# Patient Record
Sex: FEMALE | Race: BLACK OR AFRICAN AMERICAN | NOT HISPANIC OR LATINO | Employment: OTHER | URBAN - METROPOLITAN AREA
[De-identification: names, ages, dates, MRNs, and addresses within clinical notes are randomized per-mention and may not be internally consistent; named-entity substitution may affect disease eponyms.]

---

## 2021-02-15 ENCOUNTER — TRANSCRIBE ORDERS (OUTPATIENT)
Dept: ADMINISTRATIVE | Facility: HOSPITAL | Age: 41
End: 2021-02-15

## 2021-02-15 ENCOUNTER — LAB (OUTPATIENT)
Dept: LAB | Facility: HOSPITAL | Age: 41
End: 2021-02-15
Attending: FAMILY MEDICINE
Payer: MEDICARE

## 2021-02-15 DIAGNOSIS — K59.00 CONSTIPATION, UNSPECIFIED CONSTIPATION TYPE: ICD-10-CM

## 2021-02-15 DIAGNOSIS — R53.83 FATIGUE, UNSPECIFIED TYPE: ICD-10-CM

## 2021-02-15 DIAGNOSIS — I10 ESSENTIAL HYPERTENSION, MALIGNANT: ICD-10-CM

## 2021-02-15 DIAGNOSIS — I10 ESSENTIAL HYPERTENSION, MALIGNANT: Primary | ICD-10-CM

## 2021-02-15 LAB
25(OH)D3 SERPL-MCNC: 8.1 NG/ML (ref 30–100)
ALBUMIN SERPL BCP-MCNC: 4.1 G/DL (ref 3.4–4.8)
ALP SERPL-CCNC: 68.8 U/L (ref 35–140)
ALT SERPL W P-5'-P-CCNC: 23 U/L (ref 5–54)
ANION GAP SERPL CALCULATED.3IONS-SCNC: 7 MMOL/L (ref 4–13)
AST SERPL W P-5'-P-CCNC: 18 U/L (ref 15–41)
BASOPHILS # BLD AUTO: 0.03 THOUSANDS/ΜL (ref 0–0.1)
BASOPHILS NFR BLD AUTO: 1 % (ref 0–1)
BILIRUB SERPL-MCNC: 0.28 MG/DL (ref 0.3–1.2)
BUN SERPL-MCNC: 10 MG/DL (ref 6–20)
CALCIUM SERPL-MCNC: 9 MG/DL (ref 8.4–10.2)
CHLORIDE SERPL-SCNC: 104 MMOL/L (ref 96–108)
CHOLEST SERPL-MCNC: 162 MG/DL
CO2 SERPL-SCNC: 25 MMOL/L (ref 22–33)
CREAT SERPL-MCNC: 0.69 MG/DL (ref 0.4–1.1)
EOSINOPHIL # BLD AUTO: 0.1 THOUSAND/ΜL (ref 0–0.61)
EOSINOPHIL NFR BLD AUTO: 2 % (ref 0–6)
ERYTHROCYTE [DISTWIDTH] IN BLOOD BY AUTOMATED COUNT: 18.4 % (ref 11.6–15.1)
GFR SERPL CREATININE-BSD FRML MDRD: 127 ML/MIN/1.73SQ M
GLUCOSE P FAST SERPL-MCNC: 93 MG/DL (ref 70–105)
HCT VFR BLD AUTO: 32.7 % (ref 34.8–46.1)
HDLC SERPL-MCNC: 87 MG/DL
HGB BLD-MCNC: 10.1 G/DL (ref 11.5–15.4)
IMM GRANULOCYTES # BLD AUTO: 0.02 THOUSAND/UL (ref 0–0.2)
IMM GRANULOCYTES NFR BLD AUTO: 0 % (ref 0–2)
LDLC SERPL CALC-MCNC: 66 MG/DL (ref 0–100)
LYMPHOCYTES # BLD AUTO: 2.05 THOUSANDS/ΜL (ref 0.6–4.47)
LYMPHOCYTES NFR BLD AUTO: 36 % (ref 14–44)
MCH RBC QN AUTO: 22.5 PG (ref 26.8–34.3)
MCHC RBC AUTO-ENTMCNC: 30.9 G/DL (ref 31.4–37.4)
MCV RBC AUTO: 73 FL (ref 82–98)
MONOCYTES # BLD AUTO: 0.98 THOUSAND/ΜL (ref 0.17–1.22)
MONOCYTES NFR BLD AUTO: 17 % (ref 4–12)
NEUTROPHILS # BLD AUTO: 2.47 THOUSANDS/ΜL (ref 1.85–7.62)
NEUTS SEG NFR BLD AUTO: 44 % (ref 43–75)
NONHDLC SERPL-MCNC: 75 MG/DL
PLATELET # BLD AUTO: 379 THOUSANDS/UL (ref 149–390)
PMV BLD AUTO: 9.8 FL (ref 8.9–12.7)
POTASSIUM SERPL-SCNC: 4 MMOL/L (ref 3.5–5)
PROT SERPL-MCNC: 7.8 G/DL (ref 6.4–8.3)
RBC # BLD AUTO: 4.48 MILLION/UL (ref 3.81–5.12)
SODIUM SERPL-SCNC: 136 MMOL/L (ref 133–145)
TRIGL SERPL-MCNC: 46.6 MG/DL
TSH SERPL DL<=0.05 MIU/L-ACNC: 2.5 UIU/ML (ref 0.34–5.6)
WBC # BLD AUTO: 5.65 THOUSAND/UL (ref 4.31–10.16)

## 2021-02-15 PROCEDURE — 36415 COLL VENOUS BLD VENIPUNCTURE: CPT

## 2021-02-15 PROCEDURE — 84443 ASSAY THYROID STIM HORMONE: CPT

## 2021-02-15 PROCEDURE — 85025 COMPLETE CBC W/AUTO DIFF WBC: CPT

## 2021-02-15 PROCEDURE — 80061 LIPID PANEL: CPT

## 2021-02-15 PROCEDURE — 86618 LYME DISEASE ANTIBODY: CPT

## 2021-02-15 PROCEDURE — 80053 COMPREHEN METABOLIC PANEL: CPT

## 2021-02-15 PROCEDURE — 82306 VITAMIN D 25 HYDROXY: CPT

## 2021-02-16 LAB — B BURGDOR IGG+IGM SER-ACNC: 39

## 2021-04-23 ENCOUNTER — APPOINTMENT (EMERGENCY)
Dept: CT IMAGING | Facility: HOSPITAL | Age: 41
End: 2021-04-23
Payer: MEDICARE

## 2021-04-23 ENCOUNTER — HOSPITAL ENCOUNTER (EMERGENCY)
Facility: HOSPITAL | Age: 41
Discharge: HOME | End: 2021-04-23
Attending: EMERGENCY MEDICINE | Admitting: EMERGENCY MEDICINE
Payer: MEDICARE

## 2021-04-23 VITALS
DIASTOLIC BLOOD PRESSURE: 75 MMHG | SYSTOLIC BLOOD PRESSURE: 143 MMHG | HEART RATE: 68 BPM | TEMPERATURE: 98.5 F | BODY MASS INDEX: 44.61 KG/M2 | RESPIRATION RATE: 16 BRPM | OXYGEN SATURATION: 99 % | WEIGHT: 240 LBS

## 2021-04-23 DIAGNOSIS — R59.9 ENLARGEMENT OF LYMPH NODE: Primary | ICD-10-CM

## 2021-04-23 LAB
ANION GAP SERPL CALCULATED.3IONS-SCNC: 6 MMOL/L (ref 4–13)
BASOPHILS # BLD AUTO: 0.03 THOUSANDS/ΜL (ref 0–0.1)
BASOPHILS NFR BLD AUTO: 1 % (ref 0–1)
BUN SERPL-MCNC: 13 MG/DL (ref 6–20)
CALCIUM SERPL-MCNC: 9.5 MG/DL (ref 8.4–10.2)
CHLORIDE SERPL-SCNC: 104 MMOL/L (ref 96–108)
CO2 SERPL-SCNC: 27 MMOL/L (ref 22–33)
CREAT SERPL-MCNC: 0.68 MG/DL (ref 0.4–1.1)
EOSINOPHIL # BLD AUTO: 0.09 THOUSAND/ΜL (ref 0–0.61)
EOSINOPHIL NFR BLD AUTO: 2 % (ref 0–6)
ERYTHROCYTE [DISTWIDTH] IN BLOOD BY AUTOMATED COUNT: 19.5 % (ref 11.6–15.1)
EXT PREG TEST URINE: NEGATIVE
EXT. CONTROL ED NAV: NORMAL
GFR SERPL CREATININE-BSD FRML MDRD: 127 ML/MIN/1.73SQ M
GLUCOSE SERPL-MCNC: 87 MG/DL (ref 65–140)
HCT VFR BLD AUTO: 36.7 % (ref 34.8–46.1)
HGB BLD-MCNC: 11.2 G/DL (ref 11.5–15.4)
IMM GRANULOCYTES # BLD AUTO: 0.01 THOUSAND/UL (ref 0–0.2)
IMM GRANULOCYTES NFR BLD AUTO: 0 % (ref 0–2)
LYMPHOCYTES # BLD AUTO: 1.81 THOUSANDS/ΜL (ref 0.6–4.47)
LYMPHOCYTES NFR BLD AUTO: 29 % (ref 14–44)
MCH RBC QN AUTO: 22.4 PG (ref 26.8–34.3)
MCHC RBC AUTO-ENTMCNC: 30.5 G/DL (ref 31.4–37.4)
MCV RBC AUTO: 73 FL (ref 82–98)
MONOCYTES # BLD AUTO: 1.06 THOUSAND/ΜL (ref 0.17–1.22)
MONOCYTES NFR BLD AUTO: 17 % (ref 4–12)
NEUTROPHILS # BLD AUTO: 3.15 THOUSANDS/ΜL (ref 1.85–7.62)
NEUTS SEG NFR BLD AUTO: 51 % (ref 43–75)
PLATELET # BLD AUTO: 356 THOUSANDS/UL (ref 149–390)
PMV BLD AUTO: 9.7 FL (ref 8.9–12.7)
POTASSIUM SERPL-SCNC: 4.1 MMOL/L (ref 3.5–5)
RBC # BLD AUTO: 5.01 MILLION/UL (ref 3.81–5.12)
SODIUM SERPL-SCNC: 137 MMOL/L (ref 133–145)
TSH SERPL DL<=0.05 MIU/L-ACNC: 1.21 UIU/ML (ref 0.34–5.6)
WBC # BLD AUTO: 6.15 THOUSAND/UL (ref 4.31–10.16)

## 2021-04-23 PROCEDURE — 99284 EMERGENCY DEPT VISIT MOD MDM: CPT

## 2021-04-23 PROCEDURE — 99284 EMERGENCY DEPT VISIT MOD MDM: CPT | Performed by: EMERGENCY MEDICINE

## 2021-04-23 PROCEDURE — 85025 COMPLETE CBC W/AUTO DIFF WBC: CPT | Performed by: EMERGENCY MEDICINE

## 2021-04-23 PROCEDURE — 80048 BASIC METABOLIC PNL TOTAL CA: CPT | Performed by: EMERGENCY MEDICINE

## 2021-04-23 PROCEDURE — 36415 COLL VENOUS BLD VENIPUNCTURE: CPT | Performed by: EMERGENCY MEDICINE

## 2021-04-23 PROCEDURE — 84443 ASSAY THYROID STIM HORMONE: CPT | Performed by: EMERGENCY MEDICINE

## 2021-04-23 PROCEDURE — 96360 HYDRATION IV INFUSION INIT: CPT

## 2021-04-23 PROCEDURE — G1004 CDSM NDSC: HCPCS

## 2021-04-23 PROCEDURE — 70491 CT SOFT TISSUE NECK W/DYE: CPT

## 2021-04-23 PROCEDURE — 81025 URINE PREGNANCY TEST: CPT | Performed by: EMERGENCY MEDICINE

## 2021-04-23 RX ORDER — LIDOCAINE HYDROCHLORIDE 20 MG/ML
15 SOLUTION OROPHARYNGEAL ONCE
Status: COMPLETED | OUTPATIENT
Start: 2021-04-23 | End: 2021-04-23

## 2021-04-23 RX ORDER — METOPROLOL TARTRATE 50 MG/1
50 TABLET, FILM COATED ORAL EVERY 12 HOURS SCHEDULED
COMMUNITY

## 2021-04-23 RX ADMIN — DEXAMETHASONE SODIUM PHOSPHATE 10 MG: 10 INJECTION, SOLUTION INTRAMUSCULAR; INTRAVENOUS at 11:13

## 2021-04-23 RX ADMIN — IOHEXOL 100 ML: 350 INJECTION, SOLUTION INTRAVENOUS at 12:06

## 2021-04-23 RX ADMIN — LIDOCAINE HYDROCHLORIDE 15 ML: 20 SOLUTION ORAL; TOPICAL at 11:13

## 2021-04-23 RX ADMIN — SODIUM CHLORIDE 1000 ML: 0.9 INJECTION, SOLUTION INTRAVENOUS at 11:13

## 2021-04-23 NOTE — ED PROVIDER NOTES
History  Chief Complaint   Patient presents with    Oral Swelling     Pt states she woke up this morning feeling like there was something swollen in the back of her throat, but denies pain  Pt states she feels like she was snooring too hard  HPI    46 yo F hx of htn presents to the ed for eval of throat discomfort  Patient states she woke up and thought she snored to hard  She states she feels like she has a lump in her throat  No allergies, no similar symptoms in past  She states she has sleep apnea, and was supposed to wear cpap, but didn't wear it last night  No rashes, no difficulty breathing  No difficulty swallowing, no drooling  States she woke up like this  No other complaints on ros  Prior to Admission Medications   Prescriptions Last Dose Informant Patient Reported? Taking? AMLODIPINE BESYLATE PO 2021 at Unknown time  Yes Yes   Sig: Take 10 mg by mouth daily   Ergocalciferol (VITAMIN D2 PO) Past Week at Unknown time  Yes Yes   Sig: Take 1 25 mg by mouth once a week    metoprolol tartrate (LOPRESSOR) 50 mg tablet 2021 at Unknown time  Yes Yes   Sig: Take 50 mg by mouth every 12 (twelve) hours      Facility-Administered Medications: None       Past Medical History:   Diagnosis Date    Hypertension        Past Surgical History:   Procedure Laterality Date     SECTION      GASTRIC BYPASS         History reviewed  No pertinent family history  I have reviewed and agree with the history as documented  E-Cigarette/Vaping    E-Cigarette Use Never User      E-Cigarette/Vaping Substances     Social History     Tobacco Use    Smoking status: Current Every Day Smoker     Types: Cigars    Smokeless tobacco: Never Used   Substance Use Topics    Alcohol use: Yes     Frequency: 4 or more times a week     Drinks per session: 3 or 4    Drug use: Never       Review of Systems   Constitutional: Negative for chills, fatigue and fever  HENT: Positive for facial swelling  Negative for congestion and sore throat  Eyes: Negative for redness and visual disturbance  Respiratory: Negative for cough and shortness of breath  Cardiovascular: Negative for chest pain  Gastrointestinal: Negative for abdominal pain, diarrhea and nausea  Genitourinary: Negative for difficulty urinating, dysuria and pelvic pain  Musculoskeletal: Negative for back pain  Skin: Negative for rash  Neurological: Negative for syncope, weakness and headaches  All other systems reviewed and are negative  Physical Exam  Physical Exam  Vitals signs and nursing note reviewed  Constitutional:       General: She is not in acute distress  HENT:      Head: Normocephalic and atraumatic  Mouth/Throat:      Comments: Submandibular lymphadenopathy  No tongue swelling  No airway compromise  No drooling or trismus   Uvula midline  Eyes:      Conjunctiva/sclera: Conjunctivae normal    Neck:      Musculoskeletal: Normal range of motion  Cardiovascular:      Rate and Rhythm: Normal rate and regular rhythm  Heart sounds: Normal heart sounds  Pulmonary:      Effort: Pulmonary effort is normal  No respiratory distress  Breath sounds: Normal breath sounds  Abdominal:      General: Bowel sounds are normal       Palpations: Abdomen is soft  Tenderness: There is no abdominal tenderness  Musculoskeletal: Normal range of motion  Skin:     General: Skin is warm and dry  Findings: No rash  Neurological:      Mental Status: She is alert and oriented to person, place, and time  Cranial Nerves: No cranial nerve deficit  Sensory: No sensory deficit  Motor: No abnormal muscle tone        Coordination: Coordination normal          Vital Signs  ED Triage Vitals [04/23/21 1036]   Temperature Pulse Respirations Blood Pressure SpO2   98 5 °F (36 9 °C) 96 16 168/85 98 %      Temp src Heart Rate Source Patient Position - Orthostatic VS BP Location FiO2 (%)   -- Monitor Sitting Left arm --      Pain Score       --           Vitals:    04/23/21 1036 04/23/21 1206 04/23/21 1316   BP: 168/85 134/78 143/75   Pulse: 96 77 68   Patient Position - Orthostatic VS: Sitting           Visual Acuity      ED Medications  Medications   dexamethasone oral liquid 10 mg 1 mL (10 mg Oral Given 4/23/21 1113)   Lidocaine Viscous HCl (XYLOCAINE) 2 % mucosal solution 15 mL (15 mL Swish & Swallow Given 4/23/21 1113)   sodium chloride 0 9 % bolus 1,000 mL (0 mL Intravenous Stopped 4/23/21 1213)   iohexol (OMNIPAQUE) 350 MG/ML injection (SINGLE-DOSE) 100 mL (100 mL Intravenous Given 4/23/21 1206)       Diagnostic Studies  Results Reviewed     Procedure Component Value Units Date/Time    TSH, 3rd generation with Free T4 reflex [[id number]]  (Normal) Collected: 04/23/21 1112    Lab Status: Final result Specimen: Blood from Arm, Left Updated: 04/23/21 1157     TSH 3RD GENERATON 1 211 uIU/mL     Narrative:      Patients undergoing fluorescein dye angiography may retain small amounts of fluorescein in the body for 48-72 hours post procedure  Samples containing fluorescein can produce falsely depressed TSH values  If the patient had this procedure,a specimen should be resubmitted post fluorescein clearance        Basic metabolic panel [[id number]] Collected: 04/23/21 1112    Lab Status: Final result Specimen: Blood from Arm, Left Updated: 04/23/21 1143     Sodium 137 mmol/L      Potassium 4 1 mmol/L      Chloride 104 mmol/L      CO2 27 mmol/L      ANION GAP 6 mmol/L      BUN 13 mg/dL      Creatinine 0 68 mg/dL      Glucose 87 mg/dL      Calcium 9 5 mg/dL      eGFR 127 ml/min/1 73sq m     Narrative:      [place] guidelines for Chronic Kidney Disease (CKD):     Stage 1 with normal or high GFR (GFR > 90 mL/min/1 73 square meters)    Stage 2 Mild CKD (GFR = 60-89 mL/min/1 73 square meters)    Stage 3A Moderate CKD (GFR = 45-59 mL/min/1 73 square meters)    Stage 3B Moderate CKD (GFR = 30-44 mL/min/1 73 square meters)    Stage 4 Severe CKD (GFR = 15-29 mL/min/1 73 square meters)    Stage 5 End Stage CKD (GFR <15 mL/min/1 73 square meters)  Note: GFR calculation is accurate only with a steady state creatinine    CBC and differential [[id number]]  (Abnormal) Collected: 04/23/21 1112    Lab Status: Final result Specimen: Blood from Arm, Left Updated: 04/23/21 1125     WBC 6 15 Thousand/uL      RBC 5 01 Million/uL      Hemoglobin 11 2 g/dL      Hematocrit 36 7 %      MCV 73 fL      MCH 22 4 pg      MCHC 30 5 g/dL      RDW 19 5 %      MPV 9 7 fL      Platelets 277 Thousands/uL      Neutrophils Relative 51 %      Immat GRANS % 0 %      Lymphocytes Relative 29 %      Monocytes Relative 17 %      Eosinophils Relative 2 %      Basophils Relative 1 %      Neutrophils Absolute 3 15 Thousands/µL      Immature Grans Absolute 0 01 Thousand/uL      Lymphocytes Absolute 1 81 Thousands/µL      Monocytes Absolute 1 06 Thousand/µL      Eosinophils Absolute 0 09 Thousand/µL      Basophils Absolute 0 03 Thousands/µL     POCT pregnancy, urine [[id number]]  (Normal) Resulted: 04/23/21 1111    Lab Status: Final result Updated: 04/23/21 1111     EXT PREG TEST UR (Ref: Negative) negative     Control valid                 CT soft tissue neck with contrast   Final Result by Chaz Skinner MD (04/23 1347)   Mildly enlarged submental lymph node located just to the left of the midline, measuring 1 x 1 2 x 1 4 cm   Borderline enlarged right level 2 lymph node measuring 1 6 x 2 4 x 1 2 cm  These are nonspecific may be reactive or due to lymphoproliferative     Clinical correlation suggested   These may be evaluated clinically or with ultrasound      Small level 3 and level 4 lymph nodes which do not meet the criteria for pathologic enlargement     A 1 6 cm nodule seen in the isthmus of the thyroid gland, can be evaluated with ultrasound   Patent cervical airway   No tonsillar or peritonsillar abscess      No retropharyngeal abscess or collection             I personally discussed this study with Bob Graham on 4/23/2021 at 1:47 PM                      Workstation performed: [id number]                    Procedures  Procedures         ED Course  ED Course as of Apr 23 1433   Fri Apr 23, 2021   1123 PREGNANCY TEST URINE: negative   1202 TSH 3RD GENERATON: 1 211   1346 WBC: 6 15         MDM    Lymphadenopathy, imaging of neck shows mild lymph node swelling, no abscess  Patient informed, needs to follow up with ENT, referral placed  Patient discharged with ENT and PCP follow up  The patient was instructed to follow up as documented  Strict return precautions were discussed with the patient and the patient was instructed to return to the emergency department immediately if symptoms worsen  The patient/patient family member acknowledged and were in agreement with plan  Disposition  Final diagnoses:   Enlargement of lymph node     Time reflects when diagnosis was documented in both MDM as applicable and the Disposition within this note     Time User Action Codes Description Comment    4/23/2021  1:47 PM Rosita Dent Add [R59 9] Enlargement of lymph node       ED Disposition     ED Disposition Condition Date/Time Comment    Discharge Stable Fri Apr 23, 2021  1:46 PM Leobardo Carney discharge to home/self care              Follow-up Information     Follow up With Specialties Details Why Contact Info    Maribeth Larsen MD Otolaryngology Schedule an appointment as soon as possible for a visit today For follow up regarding your symptoms and recheck [address] [facility]  [address] [facility] [address] [facility]      Christiano Hugo MD Family Medicine Schedule an appointment as soon as possible for a visit  For follow up regarding your symptoms and recheck, As needed [address] [facility]  [address] [facility] [address] [place] [address]  [phone]            Discharge Medication List as of 4/23/2021  1:48 PM      CONTINUE these medications which have NOT CHANGED    Details AMLODIPINE BESYLATE PO Take 10 mg by mouth daily, Historical Med      Ergocalciferol (VITAMIN D2 PO) Take 1 25 mg by mouth once a week Wednesdays, Historical Med      metoprolol tartrate (LOPRESSOR) 50 mg tablet Take 50 mg by mouth every 12 (twelve) hours, Historical Med               PDMP Review     None          ED Provider  Electronically Signed by           Taty Bell MD  04/23/21 7352

## 2021-04-29 ENCOUNTER — HOSPITAL ENCOUNTER (OUTPATIENT)
Dept: ULTRASOUND IMAGING | Facility: HOSPITAL | Age: 41
Discharge: HOME | End: 2021-04-29
Payer: MEDICARE

## 2021-04-29 DIAGNOSIS — E04.1 THYROID NODULE: ICD-10-CM

## 2021-04-29 PROCEDURE — 76536 US EXAM OF HEAD AND NECK: CPT

## 2021-05-03 NOTE — NURSING NOTE
Call placed to patient to discuss upcoming appointment at [place] [address] radiology department and complete consultation with patient and her   Patient is having an US guided thyroid biopsy, bilateral lymph node biopsies  Reviewed patient's allergies, no current anticoagulant medication present, also discussed the pre and post procedure expectations  Per her  she will be coming via [facility]  Reminded patient of location and time expected for procedure, Patient expressed understanding by verbalizing and repeating instructions

## 2021-05-10 NOTE — PROGRESS NOTES
Spoke with Dr Rebecca Walker ( ENT ) regarding script clarification for Ms Gonzalez upcoming thyroid and lymph node biopsy  On Ct of the soft tissues of the neck from 4/23/2021, there were several enlarged lymph nodes with the largest being the left submental 1 0 x 1 2 x 1 4 cm, a right level 2 lymph node measuring 1 6 x 2 4 x 1 2 cm, and a left level 2 lymph node measuring 1 2 cm  On thyroid ultrasound from 4/29/2021, there was no discrete thyroid nodules noted, however the right aspect of the isthmus thyroid was larger than the left  I reviewed the above findings with Dr Gladys Munoz and Dr Rebecca Walker  Per Dr Rebecca Walker we are to biopsy the three lymph nodes listed above      [facility]

## 2021-05-11 ENCOUNTER — TRANSCRIBE ORDERS (OUTPATIENT)
Dept: ADMINISTRATIVE | Facility: HOSPITAL | Age: 41
End: 2021-05-11

## 2021-05-11 ENCOUNTER — HOSPITAL ENCOUNTER (OUTPATIENT)
Dept: RADIOLOGY | Facility: HOSPITAL | Age: 41
Discharge: HOME | End: 2021-05-11
Attending: OTOLARYNGOLOGY
Payer: MEDICARE

## 2021-05-11 DIAGNOSIS — R59.0 ANTERIOR CERVICAL ADENOPATHY: ICD-10-CM

## 2021-05-11 DIAGNOSIS — E04.1 THYROID NODULE: ICD-10-CM

## 2021-05-11 PROCEDURE — 88184 FLOWCYTOMETRY/ TC 1 MARKER: CPT | Performed by: OTOLARYNGOLOGY

## 2021-05-11 PROCEDURE — 88185 FLOWCYTOMETRY/TC ADD-ON: CPT

## 2021-05-11 PROCEDURE — 88173 CYTOPATH EVAL FNA REPORT: CPT | Performed by: PATHOLOGY

## 2021-05-11 PROCEDURE — 10005 FNA BX W/US GDN 1ST LES: CPT

## 2021-05-11 PROCEDURE — 10006 FNA BX W/US GDN EA ADDL: CPT

## 2021-05-11 RX ORDER — LIDOCAINE HYDROCHLORIDE 10 MG/ML
2 INJECTION, SOLUTION EPIDURAL; INFILTRATION; INTRACAUDAL; PERINEURAL ONCE
Status: COMPLETED | OUTPATIENT
Start: 2021-05-11 | End: 2021-05-11

## 2021-05-11 RX ADMIN — LIDOCAINE HYDROCHLORIDE 2 ML: 10 INJECTION, SOLUTION EPIDURAL; INFILTRATION; INTRACAUDAL; PERINEURAL at 10:30

## 2021-05-12 LAB
SCAN RESULT: NORMAL

## 2021-05-19 PROBLEM — K21.9 LARYNGOPHARYNGEAL REFLUX (LPR): Status: ACTIVE | Noted: 2021-05-19

## 2023-03-08 ENCOUNTER — HOSPITAL ENCOUNTER (OUTPATIENT)
Dept: RADIOLOGY | Facility: HOSPITAL | Age: 43
Discharge: HOME | End: 2023-03-08
Attending: FAMILY MEDICINE

## 2023-03-08 ENCOUNTER — APPOINTMENT (OUTPATIENT)
Dept: LAB | Facility: HOSPITAL | Age: 43
End: 2023-03-08
Attending: FAMILY MEDICINE

## 2023-03-08 DIAGNOSIS — Z13.220 SCREENING FOR LIPOID DISORDERS: ICD-10-CM

## 2023-03-08 DIAGNOSIS — R06.00 DYSPNEA, UNSPECIFIED TYPE: ICD-10-CM

## 2023-03-08 DIAGNOSIS — I10 ESSENTIAL HYPERTENSION, BENIGN: ICD-10-CM

## 2023-03-08 DIAGNOSIS — E55.9 AVITAMINOSIS D: ICD-10-CM

## 2023-03-08 LAB
25(OH)D3 SERPL-MCNC: 55.4 NG/ML (ref 30–100)
ALBUMIN SERPL BCP-MCNC: 3.8 G/DL (ref 3.5–5)
ALP SERPL-CCNC: 73 U/L (ref 34–104)
ALT SERPL W P-5'-P-CCNC: 29 U/L (ref 7–52)
ANION GAP SERPL CALCULATED.3IONS-SCNC: 10 MMOL/L (ref 4–13)
AST SERPL W P-5'-P-CCNC: 24 U/L (ref 13–39)
BASOPHILS # BLD AUTO: 0.07 THOUSANDS/ÂΜL (ref 0–0.1)
BASOPHILS NFR BLD AUTO: 1 % (ref 0–1)
BILIRUB SERPL-MCNC: 0.39 MG/DL (ref 0.2–1)
BUN SERPL-MCNC: 11 MG/DL (ref 5–25)
CALCIUM SERPL-MCNC: 9.1 MG/DL (ref 8.4–10.2)
CHLORIDE SERPL-SCNC: 102 MMOL/L (ref 96–108)
CHOLEST SERPL-MCNC: 137 MG/DL
CO2 SERPL-SCNC: 25 MMOL/L (ref 21–32)
CREAT SERPL-MCNC: 0.64 MG/DL (ref 0.6–1.3)
EOSINOPHIL # BLD AUTO: 0.12 THOUSAND/ÂΜL (ref 0–0.61)
EOSINOPHIL NFR BLD AUTO: 1 % (ref 0–6)
ERYTHROCYTE [DISTWIDTH] IN BLOOD BY AUTOMATED COUNT: 21.3 % (ref 11.6–15.1)
GFR SERPL CREATININE-BSD FRML MDRD: 111 ML/MIN/1.73SQ M
GLUCOSE P FAST SERPL-MCNC: 107 MG/DL (ref 65–99)
HCT VFR BLD AUTO: 33.8 % (ref 34.8–46.1)
HDLC SERPL-MCNC: 65 MG/DL
HGB BLD-MCNC: 9.8 G/DL (ref 11.5–15.4)
IMM GRANULOCYTES # BLD AUTO: 0.04 THOUSAND/UL (ref 0–0.2)
IMM GRANULOCYTES NFR BLD AUTO: 0 % (ref 0–2)
LDLC SERPL CALC-MCNC: 61 MG/DL (ref 0–100)
LYMPHOCYTES # BLD AUTO: 2.93 THOUSANDS/ÂΜL (ref 0.6–4.47)
LYMPHOCYTES NFR BLD AUTO: 32 % (ref 14–44)
MCH RBC QN AUTO: 19.6 PG (ref 26.8–34.3)
MCHC RBC AUTO-ENTMCNC: 29 G/DL (ref 31.4–37.4)
MCV RBC AUTO: 68 FL (ref 82–98)
MONOCYTES # BLD AUTO: 1.26 THOUSAND/ÂΜL (ref 0.17–1.22)
MONOCYTES NFR BLD AUTO: 14 % (ref 4–12)
NEUTROPHILS # BLD AUTO: 4.78 THOUSANDS/ÂΜL (ref 1.85–7.62)
NEUTS SEG NFR BLD AUTO: 52 % (ref 43–75)
NONHDLC SERPL-MCNC: 72 MG/DL
NRBC BLD AUTO-RTO: 0 /100 WBCS
PLATELET # BLD AUTO: 357 THOUSANDS/UL (ref 149–390)
PMV BLD AUTO: 10.2 FL (ref 8.9–12.7)
POTASSIUM SERPL-SCNC: 3.7 MMOL/L (ref 3.5–5.3)
PROT SERPL-MCNC: 8.1 G/DL (ref 6.4–8.4)
RBC # BLD AUTO: 5 MILLION/UL (ref 3.81–5.12)
SODIUM SERPL-SCNC: 137 MMOL/L (ref 135–147)
TRIGL SERPL-MCNC: 53 MG/DL
TSH SERPL DL<=0.05 MIU/L-ACNC: 2.69 UIU/ML (ref 0.45–4.5)
WBC # BLD AUTO: 9.2 THOUSAND/UL (ref 4.31–10.16)

## 2023-03-09 LAB
FERRITIN SERPL-MCNC: 7 NG/ML (ref 8–388)
FOLATE SERPL-MCNC: 10.8 NG/ML (ref 3.1–17.5)
IRON SATN MFR SERPL: 4 % (ref 15–50)
IRON SERPL-MCNC: 22 UG/DL (ref 50–170)
TIBC SERPL-MCNC: 531 UG/DL (ref 250–450)
VIT B12 SERPL-MCNC: 400 PG/ML (ref 100–900)

## 2023-03-10 LAB
EST. AVERAGE GLUCOSE BLD GHB EST-MCNC: 123 MG/DL
HBA1C MFR BLD: 5.9 %

## 2023-03-14 ENCOUNTER — HOSPITAL ENCOUNTER (OUTPATIENT)
Dept: PULMONOLOGY | Facility: HOSPITAL | Age: 43
Discharge: HOME | End: 2023-03-14
Attending: FAMILY MEDICINE

## 2023-03-14 DIAGNOSIS — R06.02 SHORTNESS OF BREATH: ICD-10-CM

## 2023-03-14 RX ORDER — ALBUTEROL SULFATE 2.5 MG/3ML
2.5 SOLUTION RESPIRATORY (INHALATION) ONCE
Status: COMPLETED | OUTPATIENT
Start: 2023-03-14 | End: 2023-03-14

## 2023-03-14 RX ADMIN — ALBUTEROL SULFATE 2.5 MG: 2.5 SOLUTION RESPIRATORY (INHALATION) at 08:51

## 2023-03-27 ENCOUNTER — HOSPITAL ENCOUNTER (OUTPATIENT)
Dept: NON INVASIVE DIAGNOSTICS | Facility: HOSPITAL | Age: 43
Discharge: HOME | End: 2023-03-27
Attending: FAMILY MEDICINE

## 2023-03-27 VITALS
HEIGHT: 62 IN | HEART RATE: 68 BPM | WEIGHT: 240 LBS | SYSTOLIC BLOOD PRESSURE: 143 MMHG | DIASTOLIC BLOOD PRESSURE: 75 MMHG | BODY MASS INDEX: 44.16 KG/M2

## 2023-03-27 DIAGNOSIS — R06.02 SHORTNESS OF BREATH: ICD-10-CM

## 2023-03-27 LAB
AORTIC ROOT: 2.8 CM
APICAL FOUR CHAMBER EJECTION FRACTION: 70 %
ASCENDING AORTA: 2.6 CM
E WAVE DECELERATION TIME: 223 MS
FRACTIONAL SHORTENING: 31 % (ref 28–44)
INTERVENTRICULAR SEPTUM IN DIASTOLE (PARASTERNAL SHORT AXIS VIEW): 1.2 CM
INTERVENTRICULAR SEPTUM: 1.2 CM (ref 0.6–1.1)
LAAS-AP4: 16.3 CM2
LEFT ATRIUM SIZE: 4.2 CM
LEFT INTERNAL DIMENSION IN SYSTOLE: 2.4 CM (ref 2.1–4)
LEFT VENTRICULAR INTERNAL DIMENSION IN DIASTOLE: 3.5 CM (ref 3.5–6)
LEFT VENTRICULAR POSTERIOR WALL IN END DIASTOLE: 1.1 CM
LEFT VENTRICULAR STROKE VOLUME: 30 ML
LVSV (TEICH): 30 ML
MV E'TISSUE VEL-SEP: 12 CM/S
MV PEAK A VEL: 0.68 M/S
MV PEAK E VEL: 98 CM/S
MV STENOSIS PRESSURE HALF TIME: 65 MS
MV VALVE AREA P 1/2 METHOD: 3.38 CM2
RIGHT ATRIUM AREA SYSTOLE A4C: 12.7 CM2
RIGHT VENTRICLE ID DIMENSION: 3.2 CM
SL CV LV EF: 60
SL CV PED ECHO LEFT VENTRICLE DIASTOLIC VOLUME (MOD BIPLANE) 2D: 50 ML
SL CV PED ECHO LEFT VENTRICLE SYSTOLIC VOLUME (MOD BIPLANE) 2D: 19 ML
TRICUSPID ANNULAR PLANE SYSTOLIC EXCURSION: 2.1 CM

## 2023-07-01 ENCOUNTER — HOSPITAL ENCOUNTER (OUTPATIENT)
Dept: MAMMOGRAPHY | Facility: HOSPITAL | Age: 43
Discharge: HOME | End: 2023-07-01
Payer: COMMERCIAL

## 2023-07-01 VITALS — WEIGHT: 240.3 LBS | BODY MASS INDEX: 44.22 KG/M2 | HEIGHT: 62 IN

## 2023-07-01 DIAGNOSIS — Z12.31 ENCOUNTER FOR SCREENING MAMMOGRAM FOR MALIGNANT NEOPLASM OF BREAST: ICD-10-CM

## 2023-07-01 PROCEDURE — 77067 SCR MAMMO BI INCL CAD: CPT

## 2023-07-01 PROCEDURE — 77063 BREAST TOMOSYNTHESIS BI: CPT

## 2023-08-15 ENCOUNTER — HOSPITAL ENCOUNTER (OUTPATIENT)
Dept: PULMONOLOGY | Facility: HOSPITAL | Age: 43
Discharge: HOME | End: 2023-08-15
Attending: FAMILY MEDICINE
Payer: COMMERCIAL

## 2023-08-15 DIAGNOSIS — R06.02 SHORTNESS OF BREATH: ICD-10-CM

## 2023-08-15 PROCEDURE — 94726 PLETHYSMOGRAPHY LUNG VOLUMES: CPT | Performed by: INTERNAL MEDICINE

## 2023-08-15 PROCEDURE — 94729 DIFFUSING CAPACITY: CPT | Performed by: INTERNAL MEDICINE

## 2023-08-15 PROCEDURE — 94726 PLETHYSMOGRAPHY LUNG VOLUMES: CPT

## 2023-08-15 PROCEDURE — 94729 DIFFUSING CAPACITY: CPT

## 2023-08-15 PROCEDURE — 94760 N-INVAS EAR/PLS OXIMETRY 1: CPT

## 2023-08-15 PROCEDURE — 94060 EVALUATION OF WHEEZING: CPT | Performed by: INTERNAL MEDICINE

## 2023-08-15 PROCEDURE — 94060 EVALUATION OF WHEEZING: CPT

## 2023-08-15 RX ORDER — ALBUTEROL SULFATE 2.5 MG/3ML
2.5 SOLUTION RESPIRATORY (INHALATION) ONCE
Status: COMPLETED | OUTPATIENT
Start: 2023-08-15 | End: 2023-08-15

## 2023-08-15 RX ORDER — ALBUTEROL SULFATE 2.5 MG/3ML
SOLUTION RESPIRATORY (INHALATION)
Status: COMPLETED
Start: 2023-08-15 | End: 2023-08-15

## 2023-08-15 RX ADMIN — ALBUTEROL SULFATE 2.5 MG: 2.5 SOLUTION RESPIRATORY (INHALATION) at 07:36

## 2023-08-25 ENCOUNTER — HOSPITAL ENCOUNTER (OUTPATIENT)
Dept: ULTRASOUND IMAGING | Facility: CLINIC | Age: 43
Discharge: HOME | End: 2023-08-25
Payer: COMMERCIAL

## 2023-08-25 ENCOUNTER — HOSPITAL ENCOUNTER (OUTPATIENT)
Dept: MAMMOGRAPHY | Facility: CLINIC | Age: 43
Discharge: HOME | End: 2023-08-25
Payer: COMMERCIAL

## 2023-08-25 DIAGNOSIS — R92.8 ABNORMAL MAMMOGRAM: ICD-10-CM

## 2023-08-25 PROCEDURE — 77065 DX MAMMO INCL CAD UNI: CPT

## 2023-08-25 PROCEDURE — G0279 TOMOSYNTHESIS, MAMMO: HCPCS

## 2023-08-25 PROCEDURE — 76642 ULTRASOUND BREAST LIMITED: CPT

## 2023-09-01 ENCOUNTER — APPOINTMENT (OUTPATIENT)
Dept: LAB | Facility: HOSPITAL | Age: 43
End: 2023-09-01
Payer: COMMERCIAL

## 2023-10-02 ENCOUNTER — HOSPITAL ENCOUNTER (OUTPATIENT)
Dept: ULTRASOUND IMAGING | Facility: CLINIC | Age: 43
Discharge: HOME | End: 2023-10-02
Payer: COMMERCIAL

## 2023-10-02 VITALS — SYSTOLIC BLOOD PRESSURE: 138 MMHG | DIASTOLIC BLOOD PRESSURE: 81 MMHG | HEART RATE: 91 BPM

## 2023-10-02 DIAGNOSIS — R92.8 ABNORMAL MAMMOGRAM: ICD-10-CM

## 2023-10-02 PROCEDURE — 88305 TISSUE EXAM BY PATHOLOGIST: CPT | Performed by: PATHOLOGY

## 2023-10-02 PROCEDURE — 38505 NEEDLE BIOPSY LYMPH NODES: CPT

## 2023-10-02 PROCEDURE — 88342 IMHCHEM/IMCYTCHM 1ST ANTB: CPT | Performed by: PATHOLOGY

## 2023-10-02 PROCEDURE — 88185 FLOWCYTOMETRY/TC ADD-ON: CPT

## 2023-10-02 PROCEDURE — 88341 IMHCHEM/IMCYTCHM EA ADD ANTB: CPT | Performed by: PATHOLOGY

## 2023-10-02 PROCEDURE — 76942 ECHO GUIDE FOR BIOPSY: CPT

## 2023-10-02 PROCEDURE — 88184 FLOWCYTOMETRY/ TC 1 MARKER: CPT | Performed by: FAMILY MEDICINE

## 2023-10-02 RX ORDER — LIDOCAINE HYDROCHLORIDE 10 MG/ML
5 INJECTION, SOLUTION EPIDURAL; INFILTRATION; INTRACAUDAL; PERINEURAL ONCE
Status: COMPLETED | OUTPATIENT
Start: 2023-10-02 | End: 2023-10-02

## 2023-10-02 RX ADMIN — LIDOCAINE HYDROCHLORIDE 5 ML: 10 INJECTION, SOLUTION EPIDURAL; INFILTRATION; INTRACAUDAL; PERINEURAL at 14:21

## 2023-10-02 NOTE — PROGRESS NOTES
Procedure type:    ___x__ultrasound guided _____stereotactic    Breast:    ___x__Left _____Right    Location: Axillary lymph node    Needle:16G Revolve    # of passes:4 (2 RPMI and 2 DIF)    Clip:Hydromark OpenCoil    Performed by: Dr. Jeb Temple held for 5 minutes by:Maria A Edwards Strips:    ___X__yes _____no    Hnana Rivera aid:    __X___yes_____no    Tolerated procedure:    __X___yes _____no

## 2023-10-03 LAB — SCAN RESULT: NORMAL

## 2023-10-03 NOTE — PROGRESS NOTES
Post procedure call completed 10/3/2023 10 ;22    Bleeding: _____yes __X___no    Pain: _____yes ___X___no    Redness/Swelling: ______yes ___X___no    Band aid removed: _____yes ___X__no (discussed removing when she showers)    Steri-Strips intact: ___X___yes _____no (discussed with patient to remove steri strips on 10;8/2023  if they have not come off on their own)    Pt with no questions at this time, adv will call when results available, adv to call with any questions or concerns, has name/# for contact

## 2023-10-05 ENCOUNTER — TELEPHONE (OUTPATIENT)
Dept: MAMMOGRAPHY | Facility: CLINIC | Age: 43
End: 2023-10-05

## 2023-10-05 PROCEDURE — 88342 IMHCHEM/IMCYTCHM 1ST ANTB: CPT | Performed by: PATHOLOGY

## 2023-10-05 PROCEDURE — 88341 IMHCHEM/IMCYTCHM EA ADD ANTB: CPT | Performed by: PATHOLOGY

## 2023-10-05 PROCEDURE — 88305 TISSUE EXAM BY PATHOLOGIST: CPT | Performed by: PATHOLOGY

## 2023-12-11 NOTE — PROGRESS NOTES
ASSESSMENT & PLAN: Sabrina Robertson is a 40 y.o. No obstetric history on file. with normal gynecologic exam.    1.  Routine well woman exam done today  2. Pap and HPV:  The patient's last pap and hpv was -unknown. It was normal.    Pap and cotesting was done today. Current ASCCP Guidelines reviewed. 3.  Mammogram and breast US up to date. 4. The following were reviewed in today's visit: patient declined STD testing  5. Follow up breast imaging ordered. 6.  Pt has not received HPV vaccine. Depression Screening Follow-up Plan: Patient's depression screening was positive with a PHQ-2 score of 1. Their PHQ-9 score was 10. Clinically patient does not have depression. No treatment is required. BMI Counseling: Body mass index is 51.21 kg/m². The BMI is above normal. Pharmacotherapy was ordered for patient to aid in weight loss. (Per her PCP). CC:  Annual Gynecologic Examination    HPI: Sabrina Robertson is a 40 y.o. No obstetric history on file. who presents for annual gynecologic examination. She has the following concerns:  none    Health Maintenance:    She wears her seatbelt routinely. She does perform regular monthly self breast exams. She feels safe at home. Patient Active Problem List   Diagnosis    Laryngopharyngeal reflux (LPR)       Past Medical History:   Diagnosis Date    Hypertension        Past Surgical History:   Procedure Laterality Date     SECTION N/A     x 2    GASTRIC BYPASS      US GUIDED BREAST LYMPH NODE BIOPSY LEFT Left 10/02/2023    US GUIDED LYMPH NODE BIOPSY LEFT  2021    US GUIDED LYMPH NODE BIOPSY RIGHT  2021       Past OB/Gyn History:  OB History          2    Para   2    Term   1       1    AB        Living   1         SAB        IAB        Ectopic        Multiple        Live Births   1                  Pt does not have menstrual issues.  .  History of sexually transmitted infection: No.  History of abnormal pap smears: No . Patient is not currently sexually active. heterosexual. The current method of family planning is none. Family History   Problem Relation Age of Onset    No Known Problems Sister     No Known Problems Sister     No Known Problems Sister     No Known Problems Sister     No Known Problems Sister     No Known Problems Daughter     No Known Problems Brother     No Known Problems Brother     No Known Problems Brother     No Known Problems Brother     No Known Problems Maternal Aunt     No Known Problems Paternal Aunt     No Known Problems Paternal Aunt        Social History:  Social History     Socioeconomic History    Marital status: Single     Spouse name: Not on file    Number of children: Not on file    Years of education: Not on file    Highest education level: Not on file   Occupational History    Not on file   Tobacco Use    Smoking status: Former     Types: Cigars    Smokeless tobacco: Never   Vaping Use    Vaping Use: Never used   Substance and Sexual Activity    Alcohol use: Yes    Drug use: Yes     Types: Marijuana    Sexual activity: Not on file   Other Topics Concern    Not on file   Social History Narrative    Not on file     Social Determinants of Health     Financial Resource Strain: Low Risk  (12/12/2023)    Overall Financial Resource Strain (CARDIA)     Difficulty of Paying Living Expenses: Not hard at all   Food Insecurity: No Food Insecurity (12/12/2023)    Hunger Vital Sign     Worried About Running Out of Food in the Last Year: Never true     Ran Out of Food in the Last Year: Never true   Transportation Needs: No Transportation Needs (12/12/2023)    PRAPARE - Transportation     Lack of Transportation (Medical): No     Lack of Transportation (Non-Medical):  No   Physical Activity: Not on file   Stress: Not on file   Social Connections: Not on file   Intimate Partner Violence: Not on file   Housing Stability: Low Risk  (12/12/2023)    Housing Stability Vital Sign     Unable to Pay for Housing in the Last Year: No     Number of Places Lived in the Last Year: 1     Unstable Housing in the Last Year: No     Presently lives with her partner and her daughter. Patient is co-habitating. Patient is currently unemployed   No Known Allergies    Current Outpatient Medications:     albuterol (PROVENTIL HFA,VENTOLIN HFA) 90 mcg/act inhaler, Inhale 2 puffs every 8 (eight) hours as needed, Disp: , Rfl:     AMLODIPINE BESYLATE PO, Take 10 mg by mouth daily, Disp: , Rfl:     Ergocalciferol (VITAMIN D2 PO), Take 1.25 mg by mouth once a week Wednesdays, Disp: , Rfl:     metoprolol tartrate (LOPRESSOR) 50 mg tablet, Take 50 mg by mouth every 12 (twelve) hours, Disp: , Rfl:     omeprazole (PriLOSEC) 20 mg delayed release capsule, take 1 capsule by mouth once daily, Disp: 30 capsule, Rfl: 11    Wegovy 2.4 MG/0.75ML, , Disp: , Rfl:     budesonide-formoterol (SYMBICORT) 160-4.5 mcg/act inhaler, Inhale 2 puffs 2 (two) times a day Morning and evening (Patient not taking: Reported on 12/12/2023), Disp: , Rfl:     Review of Systems:    Review of Systems  Constitutional :no fever, feels well, no tiredness, no recent weight gain or loss  ENT: no ear ache, no loss of hearing, no nosebleeds or nasal discharge, no sore throat or hoarseness. Cardiovascular: no complaints of slow or fast heart beat, no chest pain, no palpitations, no leg claudication or lower extremity edema. Respiratory: no complaints of shortness of shortness of breath, no [place]  Breasts:no complaints of breast pain, breast lump, or nipple discharge  Gastrointestinal: no complaints of abdominal pain, constipation, nausea, vomiting, or diarrhea or bloody stools  Genitourinary : no complaints of dysuria, incontinence, pelvic pain, no dysmenorrhea, vaginal discharge or abnormal vaginal bleeding and as noted in HPI. Musculoskeletal: no complaints of arthralgia, no myalgia, no joint swelling or stiffness, no limb pain or swelling.   Integumentary: no complaints of skin rash or lesion, itching or dry skin  Neurological: no complaints of headache, no confusion, no numbness or tingling, no dizziness or fainting    Objective      /75 (BP Location: Right arm, Patient Position: Sitting, Cuff Size: Adult)   Pulse 93   Resp 18   Ht 5' 2" (1.575 m)   Wt 127 kg (280 lb)   LMP 12/08/2023 (Approximate)   BMI 51.21 kg/m²     General:   appears stated age, cooperative, alert normal mood and affect   Neck: normal, supple,trachea midline, no masses   Heart: regular rate and rhythm, S1, S2 normal, no murmur, click, rub or gallop   Lungs: clear to auscultation bilaterally   Breasts: normal appearance, no masses or tenderness   Abdomen: soft, non-tender, without masses or organomegaly   Vulva: normal   Vagina: vagina positive for dark blood (end of period)   Urethra: normal   Cervix: Normal, no discharge. Uterus: not examined   Adnexa: normal adnexa   Lymphatic palpation of lymph nodes in neck, axilla, groin and/or other locations: no lymphadenopathy or masses noted   Skin normal skin turgor and no rashes.    Psychiatric orientation to person, place, and time: normal. mood and affect: normal

## 2023-12-12 ENCOUNTER — OFFICE VISIT (OUTPATIENT)
Dept: OBGYN CLINIC | Facility: CLINIC | Age: 43
End: 2023-12-12

## 2023-12-12 VITALS
RESPIRATION RATE: 18 BRPM | HEIGHT: 62 IN | WEIGHT: 280 LBS | BODY MASS INDEX: 51.53 KG/M2 | HEART RATE: 93 BPM | DIASTOLIC BLOOD PRESSURE: 75 MMHG | SYSTOLIC BLOOD PRESSURE: 118 MMHG

## 2023-12-12 DIAGNOSIS — Z12.4 CERVICAL CANCER SCREENING: ICD-10-CM

## 2023-12-12 DIAGNOSIS — Z01.419 ENCOUNTER FOR GYNECOLOGICAL EXAMINATION (GENERAL) (ROUTINE) WITHOUT ABNORMAL FINDINGS: Primary | ICD-10-CM

## 2023-12-12 PROCEDURE — 99386 PREV VISIT NEW AGE 40-64: CPT | Performed by: OBSTETRICS & GYNECOLOGY

## 2023-12-12 PROCEDURE — G0145 SCR C/V CYTO,THINLAYER,RESCR: HCPCS | Performed by: OBSTETRICS & GYNECOLOGY

## 2023-12-12 PROCEDURE — G0476 HPV COMBO ASSAY CA SCREEN: HCPCS | Performed by: OBSTETRICS & GYNECOLOGY

## 2023-12-12 RX ORDER — BUDESONIDE AND FORMOTEROL FUMARATE DIHYDRATE 160; 4.5 UG/1; UG/1
2 AEROSOL RESPIRATORY (INHALATION) 2 TIMES DAILY
COMMUNITY
Start: 2023-10-18

## 2023-12-12 RX ORDER — SEMAGLUTIDE 2.4 MG/.75ML
INJECTION, SOLUTION SUBCUTANEOUS
COMMUNITY
Start: 2023-12-07

## 2023-12-12 RX ORDER — ALBUTEROL SULFATE 90 UG/1
2 AEROSOL, METERED RESPIRATORY (INHALATION) EVERY 8 HOURS PRN
COMMUNITY
Start: 2023-11-07

## 2023-12-13 LAB
HPV HR 12 DNA CVX QL NAA+PROBE: NEGATIVE
HPV16 DNA CVX QL NAA+PROBE: NEGATIVE
HPV18 DNA CVX QL NAA+PROBE: NEGATIVE

## 2023-12-20 LAB
LAB AP GYN PRIMARY INTERPRETATION: NORMAL
Lab: NORMAL

## 2023-12-26 ENCOUNTER — TELEPHONE (OUTPATIENT)
Dept: OBGYN CLINIC | Facility: CLINIC | Age: 43
End: 2023-12-26

## 2023-12-26 NOTE — TELEPHONE ENCOUNTER
----- Message from Toma Kelly MD sent at 12/22/2023  1:31 PM EST -----  Regarding: p  Please inform pap/HPV neg    TY  ----- Message -----  From: Lab, Background User  Sent: 12/13/2023   5:11 PM EST  To: Toma Kelly MD

## 2024-03-15 ENCOUNTER — APPOINTMENT (OUTPATIENT)
Dept: LAB | Facility: HOSPITAL | Age: 44
End: 2024-03-15
Payer: COMMERCIAL

## 2024-03-15 DIAGNOSIS — E55.9 VITAMIN D DEFICIENCY: ICD-10-CM

## 2024-03-15 DIAGNOSIS — I10 HYPERTENSION, ESSENTIAL: ICD-10-CM

## 2024-03-15 DIAGNOSIS — Z13.9 SCREENING FOR UNSPECIFIED CONDITION: ICD-10-CM

## 2024-03-15 DIAGNOSIS — R73.01 IMPAIRED FASTING GLUCOSE: ICD-10-CM

## 2024-03-15 LAB
25(OH)D3 SERPL-MCNC: 16.2 NG/ML (ref 30–100)
ALBUMIN SERPL BCP-MCNC: 3.9 G/DL (ref 3.5–5)
ALP SERPL-CCNC: 63 U/L (ref 34–104)
ALT SERPL W P-5'-P-CCNC: 19 U/L (ref 7–52)
ANION GAP SERPL CALCULATED.3IONS-SCNC: 8 MMOL/L (ref 4–13)
AST SERPL W P-5'-P-CCNC: 17 U/L (ref 13–39)
BASOPHILS # BLD AUTO: 0.04 THOUSANDS/ÂΜL (ref 0–0.1)
BASOPHILS NFR BLD AUTO: 0 % (ref 0–1)
BILIRUB SERPL-MCNC: 0.36 MG/DL (ref 0.2–1)
BUN SERPL-MCNC: 9 MG/DL (ref 5–25)
CALCIUM SERPL-MCNC: 9.4 MG/DL (ref 8.4–10.2)
CHLORIDE SERPL-SCNC: 102 MMOL/L (ref 96–108)
CHOLEST SERPL-MCNC: 149 MG/DL
CO2 SERPL-SCNC: 25 MMOL/L (ref 21–32)
CREAT SERPL-MCNC: 0.66 MG/DL (ref 0.6–1.3)
EOSINOPHIL # BLD AUTO: 0.13 THOUSAND/ÂΜL (ref 0–0.61)
EOSINOPHIL NFR BLD AUTO: 1 % (ref 0–6)
ERYTHROCYTE [DISTWIDTH] IN BLOOD BY AUTOMATED COUNT: 13.9 % (ref 11.6–15.1)
EST. AVERAGE GLUCOSE BLD GHB EST-MCNC: 97 MG/DL
GFR SERPL CREATININE-BSD FRML MDRD: 109 ML/MIN/1.73SQ M
GLUCOSE P FAST SERPL-MCNC: 83 MG/DL (ref 65–99)
HBA1C MFR BLD: 5 %
HCT VFR BLD AUTO: 41.7 % (ref 34.8–46.1)
HDLC SERPL-MCNC: 71 MG/DL
HGB BLD-MCNC: 13.8 G/DL (ref 11.5–15.4)
IMM GRANULOCYTES # BLD AUTO: 0.05 THOUSAND/UL (ref 0–0.2)
IMM GRANULOCYTES NFR BLD AUTO: 1 % (ref 0–2)
LDLC SERPL CALC-MCNC: 66 MG/DL (ref 0–100)
LYMPHOCYTES # BLD AUTO: 2.43 THOUSANDS/ÂΜL (ref 0.6–4.47)
LYMPHOCYTES NFR BLD AUTO: 26 % (ref 14–44)
MCH RBC QN AUTO: 30.9 PG (ref 26.8–34.3)
MCHC RBC AUTO-ENTMCNC: 33.1 G/DL (ref 31.4–37.4)
MCV RBC AUTO: 94 FL (ref 82–98)
MONOCYTES # BLD AUTO: 1.08 THOUSAND/ÂΜL (ref 0.17–1.22)
MONOCYTES NFR BLD AUTO: 11 % (ref 4–12)
NEUTROPHILS # BLD AUTO: 5.76 THOUSANDS/ÂΜL (ref 1.85–7.62)
NEUTS SEG NFR BLD AUTO: 61 % (ref 43–75)
NONHDLC SERPL-MCNC: 78 MG/DL
NRBC BLD AUTO-RTO: 0 /100 WBCS
PLATELET # BLD AUTO: 303 THOUSANDS/UL (ref 149–390)
PMV BLD AUTO: 10.1 FL (ref 8.9–12.7)
POTASSIUM SERPL-SCNC: 3.7 MMOL/L (ref 3.5–5.3)
PROT SERPL-MCNC: 7.7 G/DL (ref 6.4–8.4)
RBC # BLD AUTO: 4.46 MILLION/UL (ref 3.81–5.12)
SODIUM SERPL-SCNC: 135 MMOL/L (ref 135–147)
TRIGL SERPL-MCNC: 58 MG/DL
TSH SERPL DL<=0.05 MIU/L-ACNC: 1.86 UIU/ML (ref 0.45–4.5)
WBC # BLD AUTO: 9.49 THOUSAND/UL (ref 4.31–10.16)

## 2024-03-15 PROCEDURE — 82306 VITAMIN D 25 HYDROXY: CPT

## 2024-03-15 PROCEDURE — 84443 ASSAY THYROID STIM HORMONE: CPT

## 2024-03-15 PROCEDURE — 36415 COLL VENOUS BLD VENIPUNCTURE: CPT

## 2024-03-15 PROCEDURE — 80053 COMPREHEN METABOLIC PANEL: CPT

## 2024-03-15 PROCEDURE — 80061 LIPID PANEL: CPT

## 2024-03-15 PROCEDURE — 86803 HEPATITIS C AB TEST: CPT

## 2024-03-15 PROCEDURE — 83036 HEMOGLOBIN GLYCOSYLATED A1C: CPT

## 2024-03-15 PROCEDURE — 85025 COMPLETE CBC W/AUTO DIFF WBC: CPT

## 2024-03-16 LAB — HCV AB SER QL: NORMAL

## 2024-03-27 ENCOUNTER — OFFICE VISIT (OUTPATIENT)
Dept: DENTISTRY | Facility: CLINIC | Age: 44
End: 2024-03-27

## 2024-03-27 VITALS — SYSTOLIC BLOOD PRESSURE: 137 MMHG | HEART RATE: 108 BPM | DIASTOLIC BLOOD PRESSURE: 83 MMHG

## 2024-03-27 DIAGNOSIS — K06.8 PAIN IN GUMS: ICD-10-CM

## 2024-03-27 DIAGNOSIS — K03.0 EXCESSIVE ATTRITION OF TEETH LIMITED TO ENAMEL: Primary | ICD-10-CM

## 2024-03-27 PROCEDURE — D0210 INTRAORAL - COMPLETE SERIES OF RADIOGRAPHIC IMAGES: HCPCS

## 2024-03-27 PROCEDURE — D0150 COMPREHENSIVE ORAL EVALUATION - NEW OR ESTABLISHED PATIENT: HCPCS

## 2024-03-27 RX ORDER — DIPHENHYDRAMINE HYDROCHLORIDE AND LIDOCAINE HYDROCHLORIDE AND ALUMINUM HYDROXIDE AND MAGNESIUM HYDRO
10 KIT EVERY 4 HOURS PRN
Qty: 119 ML | Refills: 0 | Status: SHIPPED | OUTPATIENT
Start: 2024-03-27

## 2024-03-27 NOTE — PROGRESS NOTES
"Comprehensive Exam    Rosa Gonzalez 43 y.o. female presents with self to [facility] for comprehensive exam.  PMH reviewed, no changes, ASA II. Significant medical history: HTN. Significant allergies: none per pt report. Significant medications: amlodipine, metoprolol.  Pain level 3/10    Chief complaint:   \"My gums are swollen and slightly painful. This started a few months ago. I also would like my teeth to be whiter\"    Consent:  Reviewed procedures involved with comprehensive exam including radiographs, oral exam, and periodontal probing.   Patient understands and consents.    Radiographs: FMX    Periodontal exam:  Hygiene - Poor  Plaque - Severe  Horizontal bone loss  UR - Moderate (15-33%)  UL - Moderate (15-33%)  LL - Moderate (15-33%)  LR - Moderate (15-33%)  Vertical bone loss - None  Subgingival calculus - Generalized  BOP - Generalized  Mobility - None  Furcation involvements - None  Occlusal trauma - None  Smoker - No  Diabetic - No  Periodontal Stage: III  Periodontal Grade: B  Periodontal Plan: SRP UL, UR, LL, and LR    Caries exam:   As charted    Oral cancer screening: No abnormalities detected  Soft tissues exam: Within normal limits  Hard tissues exam: Within normal limits    Occlusal assessment:  VDO / restorative space - Collapsed, edge to edge occlusion resulting in anterior teeth attrition   Overbite - 0%  Overjet -  0mm  Supra-eruptions or drifting - None  Crossbites - None  Recommended for orthodontic referral? No  Recommended for orthodontic consult at [facility]? No    Tx plan:  SRPx4 , restorations, occlusal evaluation after perio stable to tx plan front teeth. Reviewed with pt need for SRPs due to gingival inflammation, deep pockets, bone loss, heavy plaque and calc build up. Reviewed process for SRPs. Pt understood. Restorations as charted after perio is stable. Pt would like to address anterior teeth due to wear. Pt is currently edge to edge occlusion. Will need occlusal evaluation after SRPs " completed. Informed pt that magshantanuuthwash rx can be sent to help with gingival discomfort until SRPs. Pt understood and agreed. Sent Rx to pt pharmacy    Recommended recall schedule: 3 months.    Case difficulty assessment:   Criteria    Cumulative level   Medical History ASA I ASA II ASA III-V    Anesthesia No history of anesthesia problems Vasoconstrictor Intolerance History of difficulty achieving anesthesia    Patient Disposition Cooperative and compliant Anxious but cooperative Uncooperatie    Ability to Open Mouth No limitation Slight limitation in opening Significant limitation in opening    Gag Reflex None Gags occasionally with radiographs/treatment Extreme gag reflex which has compromised past dental treatment    Emergency Condition Minimal pain or swelling Moderate pain or swelling Severe pain or swelling    Caries Risk 1 to 3 Proximal Cavities 4 to 6 Proximal Cavities >6 Proximal Cavities    Missing Teeth No missing teeth 1-3 Non-canine Missing teeth >3 Missing teeth or missing any canine    Periodontal Stage Healthy Stage 1 or 2  Stage 3 or 4    Periodontal Grade Grade A Grade B Grade C    Diagnosis Signs and Symptoms consistent with recognized pulpal and periodontal conditions Extensive differential diagnosis of usual signs and symptoms required Confusing and complex signs and symptoms: difficult diagnosis.  History of chronic oral/facial pain.    Radiographic Difficulties Minimal Difficulty Obtaining/Interpreting Radiographs Moderate Difficulty: high floor of mouth, narrow or low palate, tomi, etc. Extreme Difficulty: Superimposed anatomic structures, etc.    Teeth Position Anterior/Premolar  Slight inclination <10o  Slight Rotation <10o 1st molar   Mod. Inclination 10o-30o  Mod. Rotation 10o-30o 2nd or 3rd molar   Extr. Incliniation >30o  Extr. Rotation <30o    Teeth Isolation Routine rubber dam placement  Simple pretreatment modification for rubber dam placement Extensive pretreatment modification  required for rubber dam isolation.    Teeth Morphology Normal original crown morphology.  Roots have slight to no curvature (<10o)  Closed apex <1mm in diameter  Canals visible and not reduced in size.  No Root resorption. Full coverage Restorations, Porcelain restorations, Bridge abutments.  Moderate deviation from normal tooth/root form (taurodontism, dens-in-dente, etc.).  Roots have moderate curvature (10-30o).  Crown axis differs moderately from root axis.  Apical opening 1-1.5mm in diameter. Canals and chambers visible but reduced in size, pulp stones.  Minimal apical root resorption. Restorations do not reflect original anatomy/alignment.  Significant deviation from normal tooth/root form (fusion, gemination, honey cusps, etc.).  Extreme root curvature (>30o) or S-shaped curve.  Anterior tooth or Mandibular Premolar with 2 roots.  Maxillary premolar with 3 roots.  Canal divides in middle or apical 3rd.  Tooth length >25mm.  Open apex >1.5mm.  Indistinct canals or not visible.  Extensive apical, internal, or external resorption.    Malocclusion Class I Corrected (dental or skeletal) Class II or III Non-Corrected Class II or III    Alveolar Ridge Morphology None to Mild Atrophy Moderate Atrophy Severe Atrophy    Occlusal Stability Requirements Stable and equal intensity stops on all teeth in centric relation  Anterior guidance is in harmony with the envelope of function.  All posterior teeth disclude during mandibular protrusive movement.  All posterior teeth disclude on the non-working side during mandibular lateral movement.  All posterior teeth disclude on the working side during mandibular lateral movement. 1 to 2 requirements are compromised 3 to 5 requirements are compromised    [place] to Occlusal Stability Correct interarch relationships  Correct crown angulation (tip).  Correct crown inclination (torque)  No rotations.  Tight contact points. 1 to 2 keys are compromised 3 to 5 Keys are compromiseed    Case  complexity rating = Cumulative level / (60 x 100) = ** => High    NV: SRPx4 when pre auth back.    Attending: Dr. Chadwick.

## 2024-03-29 ENCOUNTER — HOSPITAL ENCOUNTER (OUTPATIENT)
Dept: ULTRASOUND IMAGING | Facility: CLINIC | Age: 44
Discharge: HOME | End: 2024-03-29
Payer: COMMERCIAL

## 2024-03-29 DIAGNOSIS — R92.8 ABNORMAL MAMMOGRAM: ICD-10-CM

## 2024-03-29 PROCEDURE — 76642 ULTRASOUND BREAST LIMITED: CPT

## 2024-04-16 ENCOUNTER — OFFICE VISIT (OUTPATIENT)
Dept: DENTISTRY | Facility: CLINIC | Age: 44
End: 2024-04-16

## 2024-04-16 VITALS — SYSTOLIC BLOOD PRESSURE: 136 MMHG | DIASTOLIC BLOOD PRESSURE: 85 MMHG | HEART RATE: 87 BPM

## 2024-04-16 DIAGNOSIS — K05.4 PERIODONTOSIS: Primary | ICD-10-CM

## 2024-04-16 PROCEDURE — D4341 PERIODONTAL SCALING AND ROOT PLANING - 4 OR MORE TEETH PER QUADRANT: HCPCS

## 2024-04-16 NOTE — PATIENT INSTRUCTIONS
Scaling + Root Planing Post Op Instructions    You had a procedure performed called Scaling + Root Planing (SRP). This is a non-surgical treatment of periodontal disease (gum disease). The purpose of this treatment is to remove bacterial plaque and tartar from the root surface below the gumline, which can cause serious health conditions including bone loss. The goal of this treatment is to allow reattachment of the gums to the clean root surface and to shrink the periodontal pockets to levels that can be maintained by daily flossing and brushing.    Following the procedure  When anesthesia has been used, your lips, teeth and tongue may remain numb after the procedure. The length of time you experience numbness varies, depending on the type of anesthetic received. Please be aware of your tongue and lips. It is easy to bite or burn your tongue or lips while numb. Please do not eat or drink anything that is excessively hot. You may want to refrain from eating or drinking anything until the numbness has worn off.    Discomfort or Pain  It is not uncommon for your gum tissue to feel “achy” for a few days. You may take an over the counter analgesic such as Advil or Aleve if you have any tenderness of the gums. If you must avoid these analgesics because you are already taking NSAID’s, are allergic to them, or you have ulcers, then you may take acetaminophen (Tylenol). Please follow dosage recommendations on the product labels.    Tooth Sensitivity  It is not unusual for your teeth to be more sensitive to hot or cold temperatures, and/or sweets. This occurs as the gum tissue heals and shrinks in size. This sensitivity is the most common complaint after scaling and root planing. Any sensitivity should gradually go away in a few weeks. Brushing daily with sensitivity toothpaste or using fluoride rinses may help alleviate this  over time.    Avoid Smoking/Tobacco products  Smoking and the use of tobacco products should be avoided for a minimum of 24-48 hrs. Tobacco use slows the healing process and increases the chance of post op complications. Smoking cessation is highly recommended.    Bleeding  Slight bleeding may continue for 24-48 hours. This is not unusual and should subside. You may return to work, but it is advised to limit strenuous activity for 24 hours after the procedure as this may increase bleeding. For the rest of the day, avoid rinsing your mouth vigorously, as this will prolong the bleeding. If bleeding continues, apply light pressure to the area with a moistened gauze or moistened tea bag. Keep in place for 20-30 minutes. If you are still concerned about bleeding after following these instructions, please call our office.    Food/ Diet  You may eat as tolerated after the numbness has worn off. Your next meal should consist of soft foods. ( pasta, scrambled eggs, mashed potatoes, mac + cheese, etc. ) Avoid hard crunchy foods like chips, popcorn, nuts or seeds, alcoholic beverages and hot liquids for today.    Maintain Home Care/Follow up visits  Resume your at home oral healthcare routine by regular brushing 2x daily and flossing 1x daily even if you experience any bleeding or tenderness in the gums. Proper dental hygiene at home as well as routine visits to our dental office are the most important ways you can maintain the health of your gums after scaling and root planing. It is good to rinse your mouth with warm salt water rinses (1/4 teaspoon of salt in full glass of comfortably very warm water) or an antimicrobial mouthwash after the procedure. After treatment, your gums should appear more pink, less swollen, and bleed less. These are signs of healing and improving periodontal health. 3-4 month maintenance cleanings are recommended to monitor your progress and treat periodontal disease.

## 2024-04-16 NOTE — PROGRESS NOTES
SCALE AND RP UR   Local anesthesia administered by Dr. Chadwick  Gave 1.5 Carpule 2% Lidocaine 1:100K epi inifltrations on UR quadrant. Pt tolerated very well. Explained to pt that gums are very puffy and lots of overgrowth. Explained we could contour them and remove the granulation tissues in the pockets to help with the pocket and gingival shrinkage. Pt agreed and consented. Dr Chadwick performed removal of granulation tissue. Explained after the scaling is completed, we'll need to eval and pt might need a gingivectomy as well. Explained plaque, calculus contributing to gums irritation puffiness but her Meds contributing to gingiva overgrowth as well.                   CHIEF CONCERN: none   PAIN SCALE: 0  ASA CLASS: II  PLAQUE:   moderate      CALCULUS:   moderate    BLEEDING:     heavy   STAIN :none  ORAL HYGIENE:   fair     Hand scaled, polished and flossed. Used cavitron    Oral Hygiene Instruction:  recommended brushing 2 x daily for 2 minutes MIN, recommended flossing daily, reviewed dietary precautions.post op sc/rp instructions placed in AVS, printed and handed/ reviewed with patient.   Instructed pt not to spit remainder of day. Reviewed AVS instructions.    Soft tissue exam:  soft tissue exam was normal  ExtraOral exam:   Extraoral exam was normal    REFERRALS: no referrals needed         NEXT VISIT:   --->sc/rp LR  ---> sc/rp UL  ---> sc/rp LL    Last Panorex/ FMX : 3/29/24

## 2024-04-23 ENCOUNTER — OFFICE VISIT (OUTPATIENT)
Dept: DENTISTRY | Facility: CLINIC | Age: 44
End: 2024-04-23

## 2024-04-23 VITALS — SYSTOLIC BLOOD PRESSURE: 125 MMHG | DIASTOLIC BLOOD PRESSURE: 83 MMHG

## 2024-04-23 DIAGNOSIS — K05.4 PERIODONTOSIS: Primary | ICD-10-CM

## 2024-04-23 PROCEDURE — D4341 PERIODONTAL SCALING AND ROOT PLANING - 4 OR MORE TEETH PER QUADRANT: HCPCS

## 2024-04-23 NOTE — DENTAL PROCEDURE DETAILS
Reviewed Medical History-still using Magic mouthrinse  ASA II  CC: none    Scaling and Root Planing: LR  Applied topical gel 20% Benzocaine dye free  Administered 3/4 carpule Septocaine  with epi 1:100,000 1.7mL Infiltrated short  Pt. tolerated well  Hand and ultrasonic Scaled and root planned  Post subgingival irrigation with Chlorhexidine  Heavy bleeding, subgingival calculus  Severe gingival enlargement  Post op instructions given: nothing hot, no chewing until numbness subsides. No frequent bending or heavy lifting next 24 hrs due to heavy bldg. Pt understands.     NV: SRP UL with possible gingival contouring with DR Chadwick  SRP LL-& gingival contouring with Dr. Chadwick LR as well  Needs: rests  Discuss with  about referring to Dr. LM Noriega to evaluate perio.     Milagro Garcia, PARESH., PHDHP.

## 2024-05-08 ENCOUNTER — OFFICE VISIT (OUTPATIENT)
Dept: DENTISTRY | Facility: CLINIC | Age: 44
End: 2024-05-08

## 2024-05-08 VITALS — HEART RATE: 75 BPM | DIASTOLIC BLOOD PRESSURE: 86 MMHG | SYSTOLIC BLOOD PRESSURE: 136 MMHG

## 2024-05-08 DIAGNOSIS — K05.6 PERIODONTAL DISEASE: Primary | ICD-10-CM

## 2024-05-08 PROCEDURE — D4341 PERIODONTAL SCALING AND ROOT PLANING - 4 OR MORE TEETH PER QUADRANT: HCPCS

## 2024-05-08 NOTE — PROGRESS NOTES
SCALE AND RP UL   Local anesthesia administered by Dr. Chadwick       1.5 carpule/s given- 2% lidocaine 1:100K epi Infiltration on upper left. Pt tolerated well. Gingival very puffy, red and lots of gingival granulation present. Pt mentioned the gingiva looks really good on upper right where the gingival contour was done but on the lower right gingival is still very puffy and swollen. Offered to do minor gingival contour to. Pt agreed. Dr. Chadwick Used #15 scapel to contour gingiva. Post op given.   CHIEF CONCERN: none   PAIN SCALE: 0  ASA CLASS: ASA 2 - Patient with mild systemic disease with no functional limitations  PLAQUE:moderate  CALCULUS: Heavy  BLEEDING:    heavy  STAIN : Light  PERIO:     Hand scaled, polished and flossed. Used cavitron    Oral Hygiene Instruction:  recommended brushing 2 x daily for 2 minutes MIN, recommended flossing daily, reviewed dietary precautions. Post op sc/rp instructions placed in AVS, printed and handed/ reviewed with patient.     Soft tissue exam:  soft tissue exam was normal  ExtraOral exam:   Extraoral exam was normal    REFERRALS: no referrals provided         NEXT VISIT:   --->sc/rp LR/ dr Chadwick to gingival contour LR and LL    Last Panorex/ FMX : 3/29/24

## 2024-05-23 ENCOUNTER — OFFICE VISIT (OUTPATIENT)
Dept: DENTISTRY | Facility: CLINIC | Age: 44
End: 2024-05-23

## 2024-05-23 VITALS — DIASTOLIC BLOOD PRESSURE: 85 MMHG | SYSTOLIC BLOOD PRESSURE: 126 MMHG | HEART RATE: 90 BPM

## 2024-05-23 DIAGNOSIS — K05.6 PERIODONTAL DISEASE: Primary | ICD-10-CM

## 2024-05-23 PROCEDURE — D4341 PERIODONTAL SCALING AND ROOT PLANING - 4 OR MORE TEETH PER QUADRANT: HCPCS

## 2024-05-23 NOTE — PROGRESS NOTES
SCALE AND RP LL   Local anesthesia administered by Dr. Chadwick       1 carpule/s given- 4% Septocaine 1:100K epi infiltrations . Pt tolerated very well. Offered to do some gingival contour together with the deep cleaning and pt agreed. Dr Chadwick performed Mild Gingival contour done with #15 scalpel to contour the heavy swollen and overgrowth of the gingiva. Upper gingiva looks healed and much better, no hyper growth seen on upper.   CHIEF CONCERN: none   PAIN SCALE: 0  ASA CLASS: ASA 2 - Patient with mild systemic disease with no functional limitations  PLAQUE:heavy  CALCULUS: Moderate  BLEEDING:    heavy  STAIN : Moderate  PERIO: 3/A    Hand scaled, polished and flossed. Used cavitron    Oral Hygiene Instruction:  recommended brushing 2 x daily for 2 minutes MIN, recommended flossing daily, reviewed dietary precautions. Post op sc/rp instructions placed in AVS, printed and handed/ reviewed with patient.     Soft tissue exam:  soft tissue exam was normal  ExtraOral exam:   Extraoral exam was normal  Dr. Chadwick recommended pt discuss medication with patient. Amlodipine possibly causing overgrowth of gums.   REFERRALS: no referrals provided         NEXT VISIT:   --->3 MONTH PERIO MAINTENANCE  ---> fillings   Last Panorex/ FMX : 3/29/24

## 2024-07-02 ENCOUNTER — OFFICE VISIT (OUTPATIENT)
Dept: DENTISTRY | Facility: CLINIC | Age: 44
End: 2024-07-02

## 2024-07-02 VITALS — SYSTOLIC BLOOD PRESSURE: 134 MMHG | DIASTOLIC BLOOD PRESSURE: 84 MMHG | HEART RATE: 94 BPM

## 2024-07-02 DIAGNOSIS — K02.62 DENTAL CARIES EXTENDING INTO DENTIN: Primary | ICD-10-CM

## 2024-07-02 PROCEDURE — D2392 RESIN-BASED COMPOSITE - 2 SURFACES, POSTERIOR: HCPCS | Performed by: DENTIST

## 2024-07-02 NOTE — DENTAL PROCEDURE DETAILS
Patient presents for a dental restoration and verbally consents for treatment:  Reviewed health history-  Pt is ASA type II  Treatment consents signed: Yes  Perio: Gingivitis  Pain Scale: 0  Caries Assessment: Medium    Radiographs: Films are current  Oral Hygiene instruction reviewed and given  Hygiene recall visits recommended to the patient    Patient agrees with the diagnosis of Caries and the proposed treatment plan for the resin restoration: Explained need for root canal if caries is very deep or if pulp becomes inflamed in the future. Pt understands and agreed.   Tooth ##3 and #4  Dental Anesthesia:  1 carpule 2% Lidocaine 1:100K epi infiltration given.   Material:   Etch Ivoclar bond and resin   Shade: Shade A2  Polished and flossed. Post op given to not be chewing till numbness goes away.    Prognosis is Good.   Referrals Needed: No    Next visit: Leah Christopher

## 2024-08-19 ENCOUNTER — HOSPITAL ENCOUNTER (EMERGENCY)
Facility: HOSPITAL | Age: 44
Discharge: HOME | End: 2024-08-19
Attending: INTERNAL MEDICINE
Payer: COMMERCIAL

## 2024-08-19 VITALS
RESPIRATION RATE: 14 BRPM | TEMPERATURE: 97.8 F | DIASTOLIC BLOOD PRESSURE: 73 MMHG | OXYGEN SATURATION: 98 % | SYSTOLIC BLOOD PRESSURE: 132 MMHG | HEART RATE: 88 BPM

## 2024-08-19 DIAGNOSIS — R22.0 LIP SWELLING: Primary | ICD-10-CM

## 2024-08-19 PROCEDURE — 99282 EMERGENCY DEPT VISIT SF MDM: CPT

## 2024-08-19 PROCEDURE — 99284 EMERGENCY DEPT VISIT MOD MDM: CPT | Performed by: PHYSICIAN ASSISTANT

## 2024-08-19 RX ORDER — PREDNISONE 20 MG/1
60 TABLET ORAL ONCE
Status: COMPLETED | OUTPATIENT
Start: 2024-08-19 | End: 2024-08-19

## 2024-08-19 RX ORDER — DIPHENHYDRAMINE HCL 25 MG
50 TABLET ORAL ONCE
Status: COMPLETED | OUTPATIENT
Start: 2024-08-19 | End: 2024-08-19

## 2024-08-19 RX ADMIN — DIPHENHYDRAMINE HYDROCHLORIDE 50 MG: 25 TABLET ORAL at 11:16

## 2024-08-19 RX ADMIN — PREDNISONE 60 MG: 20 TABLET ORAL at 11:16

## 2024-08-19 NOTE — DISCHARGE INSTRUCTIONS
Do not take your metoprolol medication tonight.    Start tomorrow and only take 1 medication at a time to try to determine what medication may be causing symptoms.    Tomorrow in AM:   You can start by taking 2 puffs of your Aalbuterol first, wait 15 minutes  Then take your omeprazole wait 15 minutes  Next take your amlodipine, wait about 30 minutes  If no reaction, you can try her metoprolol.  We suspect this may be the medication that could be causing the reaction, since this is the pill that was recently changed.  If this causes the swelling, immediately take Benadryl and apply ice.    Then stop taking which ever medication is causing symptoms.    Otherwise follow-up with your doctor for medication adjustments and further evaluation and treatment.    Return to emergency department if symptoms persist or trouble breathing or symptoms worse.

## 2024-08-19 NOTE — ED PROVIDER NOTES
"History  Chief Complaint   Patient presents with    Lip Swelling     Pt presents to ED via WR, c/o lip and L eye swelling. Denies trouble swallowing or breathing, airway is patent, 98% RA, pt breathing at 14bpm. Pt states she has had intermittent and multiple episodes of lip swelling \"after taking my AM meds, I don't know which one is doing this to me\". Pt reports she took benadryl at 1000.      Past Medical History: Anemia, Heart abnormality - as per pt. takes med for chest pressure, thinks she has murmur, HTN,   Past Surgical History:  SECTION x 2, GASTRIC BYPASS,     Pt presents to ED c/o weeks of intermittent lip swelling that begins shortly after taking her morning meds (amlodipine, albuterol, omeprazole, metoprolol), for which she takes Benadryl for and then about 4 to 5 hours later, symptoms resolve.  Today she awoke, did the same thing, took her meds, then about 15 minutes later, again had lip swelling but then progressed around left eye and patient states she came to emergency department today because she is just tired of having lip swelling and wanted to figure it out.  Patient denies shortness of breath, throat closing, wheezing, rash, LE edema, trouble swallowing/talking/eating.   states that the metoprolol is a new pill, it's a different size and shape, then she is used to taking thinks it might be from that.    pOx 98% RA, VSS.  Pt took Benadryl PTA        Prior to Admission Medications   Prescriptions Last Dose Informant Patient Reported? Taking?   AMLODIPINE BESYLATE PO  Self Yes No   Sig: Take 10 mg by mouth daily   Ergocalciferol (VITAMIN D2 PO)  Self Yes No   Sig: Take 1.25 mg by mouth once a week    Wegovy 2.4 MG/0.75ML  Self Yes No   albuterol (PROVENTIL HFA,VENTOLIN HFA) 90 mcg/act inhaler  Self Yes No   Sig: Inhale 2 puffs every 8 (eight) hours as needed   budesonide-formoterol (SYMBICORT) 160-4.5 mcg/act inhaler  Self Yes No   Sig: Inhale 2 puffs 2 (two) times a day " Morning and evening   diphenhydramine, lidocaine, Al/Mg hydroxide, simethicone (Magic Mouthwash) SUSP   No No   Sig: Swish and spit 10 mL every 4 (four) hours as needed for mouth pain or discomfort   metoprolol tartrate (LOPRESSOR) 50 mg tablet  Self Yes No   Sig: Take 50 mg by mouth every 12 (twelve) hours   omeprazole (PriLOSEC) 20 mg delayed release capsule   No No   Sig: TAKE 1 CAPSULE BY MOUTH ONCE DAILY      Facility-Administered Medications: None       Past Medical History:   Diagnosis Date    Anemia     Heart abnormality     as per pt. takes med for chest pressure, thinks she has murmur    Hypertension        Past Surgical History:   Procedure Laterality Date     SECTION N/A     x 2    GASTRIC BYPASS      US GUIDED BREAST LYMPH NODE BIOPSY LEFT Left 10/02/2023    US GUIDED LYMPH NODE BIOPSY LEFT  2021    US GUIDED LYMPH NODE BIOPSY RIGHT  2021       Family History   Problem Relation Age of Onset    No Known Problems Sister     No Known Problems Sister     No Known Problems Sister     No Known Problems Sister     No Known Problems Sister     No Known Problems Daughter     No Known Problems Brother     No Known Problems Brother     No Known Problems Brother     No Known Problems Brother     No Known Problems Maternal Aunt     No Known Problems Paternal Aunt     No Known Problems Paternal Aunt      I have reviewed and agree with the history as documented.    E-Cigarette/Vaping    E-Cigarette Use Never User      E-Cigarette/Vaping Substances     Social History     Tobacco Use    Smoking status: Former     Types: Cigars    Smokeless tobacco: Never   Vaping Use    Vaping status: Never Used   Substance Use Topics    Alcohol use: Yes    Drug use: Yes     Types: Marijuana       Review of Systems   Constitutional:  Negative for fever.   HENT:  Negative for facial swelling, sneezing, sore throat and trouble swallowing.    Respiratory:  Negative for cough, shortness of breath and wheezing.     Cardiovascular:  Negative for palpitations.   Gastrointestinal:  Negative for nausea and vomiting.   Musculoskeletal:  Negative for myalgias.   Skin:  Negative for rash.   Neurological:  Negative for weakness and headaches.   Psychiatric/Behavioral:  Negative for behavioral problems.        Physical Exam  Physical Exam  Vitals and nursing note reviewed.   Constitutional:       General: She is not in acute distress.     Appearance: Normal appearance. She is well-developed.   HENT:      Head: Normocephalic and atraumatic.      Nose: Nose normal.      Mouth/Throat:      Mouth: Mucous membranes are moist.      Pharynx: Oropharynx is clear.      Comments: Mild swelling noted to lower lip  Eyes:      General:         Right eye: No discharge.         Left eye: No discharge.      Conjunctiva/sclera: Conjunctivae normal.      Comments: Mild swelling noted to left periorbital region   Cardiovascular:      Rate and Rhythm: Normal rate and regular rhythm.   Pulmonary:      Effort: Pulmonary effort is normal. No respiratory distress.      Breath sounds: No wheezing or rhonchi.   Musculoskeletal:         General: Normal range of motion.      Cervical back: Normal range of motion.   Skin:     General: Skin is warm and dry.      Findings: No erythema or rash.   Neurological:      Mental Status: She is alert.   Psychiatric:         Behavior: Behavior normal.         Vital Signs  ED Triage Vitals [08/19/24 1037]   Temperature Pulse Respirations Blood Pressure SpO2   97.8 °F (36.6 °C) 88 14 132/73 98 %      Temp Source Heart Rate Source Patient Position - Orthostatic VS BP Location FiO2 (%)   Oral Monitor Sitting Left arm --      Pain Score       No Pain           Vitals:    08/19/24 1037   BP: 132/73   Pulse: 88   Patient Position - Orthostatic VS: Sitting         Visual Acuity      ED Medications  Medications   diphenhydrAMINE (BENADRYL) tablet 50 mg (50 mg Oral Given 8/19/24 1116)   predniSONE tablet 60 mg (60 mg Oral Given  8/19/24 1116)       Diagnostic Studies  Results Reviewed       None                   No orders to display              Procedures  Procedures         ED Course                                               Medical Decision Making  Pt with lip and left periorbital swelling suspect angioedema patient does not take an ACE inhibitor   Has been states metoprolol pill has changed   Will treat with Benadryl and steroids here in emergency department and observe   After 1 hour observation, patient feeling better near complete resolution of facial swelling,  still some slight right-sided lower lip swelling patient would like to be discharged, does not want a wait any longer states this usually gets better on its own will recommend that patient take 1 medication at a time to see if she identifies which medication is causing her symptoms and follows with her primary care doctor; patient is agreeable to this, vital signs stable, no respiratory distress    Amount and/or Complexity of Data Reviewed  External Data Reviewed: notes.    Risk  OTC drugs.  Prescription drug management.                 Disposition  Final diagnoses:   Lip swelling     Time reflects when diagnosis was documented in both MDM as applicable and the Disposition within this note       Time User Action Codes Description Comment    8/19/2024 12:30 PM Emma Barillas Add [R22.0] Lip swelling           ED Disposition       ED Disposition   Discharge    Condition   Stable    Date/Time   Mon Aug 19, 2024 12:30 PM    Comment   Rosa Gonzalez discharge to home/self care.                   Follow-up Information       Follow up With Specialties Details Why Contact Info    Nikia Gonzalez MD Family Medicine   [address] [place] [address]  [address]  [facility] [address] [phone]              Discharge Medication List as of 8/19/2024 12:34 PM        CONTINUE these medications which have NOT CHANGED    Details   albuterol (PROVENTIL HFA,VENTOLIN HFA) 90 mcg/act inhaler Inhale 2 puffs  every 8 (eight) hours as needed, Starting Tue 11/7/2023, Historical Med      AMLODIPINE BESYLATE PO Take 10 mg by mouth daily, Historical Med      budesonide-formoterol (SYMBICORT) 160-4.5 mcg/act inhaler Inhale 2 puffs 2 (two) times a day Morning and evening, Starting Wed 10/18/2023, Historical Med      diphenhydramine, lidocaine, Al/Mg hydroxide, simethicone (Magic Mouthwash) SUSP Swish and spit 10 mL every 4 (four) hours as needed for mouth pain or discomfort, Starting Wed 3/27/2024, Normal      Ergocalciferol (VITAMIN D2 PO) Take 1.25 mg by mouth once a week Wednesdays, Historical Med      metoprolol tartrate (LOPRESSOR) 50 mg tablet Take 50 mg by mouth every 12 (twelve) hours, Historical Med      omeprazole (PriLOSEC) 20 mg delayed release capsule TAKE 1 CAPSULE BY MOUTH ONCE DAILY, Normal      Wegovy 2.4 MG/0.75ML Historical Med             No discharge procedures on file.    PDMP Review       None            ED Provider  Electronically Signed by             Emma Barillas PA-C  08/19/24 3887

## 2024-08-21 ENCOUNTER — OFFICE VISIT (OUTPATIENT)
Dept: DENTISTRY | Facility: CLINIC | Age: 44
End: 2024-08-21

## 2024-08-21 VITALS — DIASTOLIC BLOOD PRESSURE: 85 MMHG | SYSTOLIC BLOOD PRESSURE: 127 MMHG | HEART RATE: 86 BPM

## 2024-08-21 DIAGNOSIS — K02.62 DENTAL CARIES EXTENDING INTO DENTIN: Primary | ICD-10-CM

## 2024-08-21 PROCEDURE — D2331 RESIN-BASED COMPOSITE - 2 SURFACES, ANTERIOR: HCPCS | Performed by: DENTIST

## 2024-08-21 NOTE — DENTAL PROCEDURE DETAILS
Patient presents for a dental restoration and verbally consents for treatment:  Reviewed health history-  Pt is ASA type II  Treatment consents signed: Yes  Perio:   Pain Scale:0  Caries Assessment: high  Radiographs: Films are current  Oral Hygiene instruction reviewed and given  Hygiene recall visits recommended to the patient  Oral cancer screening done and no pathology noted on ROM, FOM , Palate,soft tissue or tongue.    Patient agrees with the diagnosis of Caries and the proposed treatment plan for the resin restoration:  Tooth #6-DL  Discussed with patient need for RCT if pulp exposure occurs or in the future if pulp is inflamed. Pt understands and consents.    Dental Anesthesia: 0.5 Carpule 2% Lidocaine 1:100K epi infiltrations.  Excavated caries with high speed and round bur on slow speed.  Material:  Selective etch and rinsed. Ivoclar shell placed and EvoFlow resin placed and cured.  Shade: A2  Polished with finishing burs and Enhance. Occlusion adjusted and contact good and adequate.   Gave post op instructions to avoid chewing till numbness goes away.    All questions answered. Pt left satisfied and in good health.    Prognosis is Good.   Referrals Needed: No      Next visit: resin #29    Candi

## 2024-09-09 NOTE — PROGRESS NOTES
Periodic Exam and 3 Month Periodontal Maintenance     REVIEWED MED HX: meds, allergies, health changes reviewed in EPIC  CHIEF CONCERN:  none   PAIN SCALE:  0  ASA CLASS:  ASA 2 - Patient with mild systemic disease with no functional limitations  PLAQUE:  {Plaque Level:55639}  CALCULUS:  {Calculus:70676}  BLEEDING:  {Bleedin}  STAIN :   {Stain:}  PERIO:     Hygiene Procedures: Scaled, Polished, Flossed and Used Cavitron    Oral Hygiene Instruction: Brushing Minimum 2x daily for 2 minutes, daily flossing and Reviewed dietary precautions    Visual and Tactile Intraoral/ Extraoral evaluation: Oral and Oropharyngeal cancer evaluation. No findings     REFERRALS: none    EXAM: {perio maintenance exam:69031}    CARIES FINDINGS:     Next Recall: 3 month perio maintenance/periodic exam    Last Panorex/ FMX : 3/27/24

## 2024-09-10 ENCOUNTER — OFFICE VISIT (OUTPATIENT)
Dept: DENTISTRY | Facility: CLINIC | Age: 44
End: 2024-09-10

## 2024-09-10 VITALS — DIASTOLIC BLOOD PRESSURE: 83 MMHG | SYSTOLIC BLOOD PRESSURE: 134 MMHG | HEART RATE: 78 BPM

## 2024-09-10 DIAGNOSIS — Z01.20 ENCOUNTER FOR DENTAL EXAM AND CLEANING W/O ABNORMAL FINDINGS: Primary | ICD-10-CM

## 2024-09-10 PROCEDURE — D0120 PERIODIC ORAL EVALUATION - ESTABLISHED PATIENT: HCPCS

## 2024-09-10 PROCEDURE — D4910 PERIODONTAL MAINTENANCE: HCPCS

## 2024-09-10 NOTE — DENTAL PROCEDURE DETAILS
Periodic Exam and 3 Month Periodontal Maintenance    REVIEWED MED HX: meds, allergies, health changes reviewed in EPIC  CHIEF CONCERN:  none   PAIN SCALE:  0  ASA CLASS:  ASA 2 - Patient with mild systemic disease with no functional limitations  PLAQUE:  moderate  CALCULUS:  Moderate  BLEEDING:  heavy  STAIN :   None  PERIO: perio not stable. A lot inflammation / bleeding. Full probe completed.     Hygiene Procedures: Scaled, Polished, Flossed and Used Cavitron    Oral Hygiene Instruction: Brushing Minimum 2x daily for 2 minutes, daily flossing and Reviewed dietary precautions    Visual and Tactile Intraoral/ Extraoral evaluation: Oral and Oropharyngeal cancer evaluation. No findings     REFERRALS: none    EXAM: Dr. Richey  Discussed with patient condition of gums. Uppers have improved. Lowers improved but still gingiva hyperplasia present due to medications. Dr Chadwick recommended last visit that patient discuss with her physician to see if medication can be switched. Pt reports she did not discuss with physician. Again recommend. Possibly HBP med causing overgrowth.     CARIES FINDINGS: 29- DO    NV: #29 DO. Discuss partials-pre auth. Dr to evaluate to smooth #8/9 per  ( Incisal attrition).   discussed niteguard.     Next Recall: 3 month perio maintenance/periodic exam    Last Panorex/ FMX : 3/27/24

## 2024-09-23 ENCOUNTER — APPOINTMENT (OUTPATIENT)
Dept: LAB | Facility: HOSPITAL | Age: 44
End: 2024-09-23
Attending: FAMILY MEDICINE
Payer: COMMERCIAL

## 2024-09-23 DIAGNOSIS — R73.01 IMPAIRED FASTING GLUCOSE: ICD-10-CM

## 2024-09-23 DIAGNOSIS — D64.9 RELATIVE ANEMIA: ICD-10-CM

## 2024-09-23 DIAGNOSIS — I10 ESSENTIAL HYPERTENSION, MALIGNANT: ICD-10-CM

## 2024-09-23 LAB
ALBUMIN SERPL BCG-MCNC: 4 G/DL (ref 3.5–5)
ALP SERPL-CCNC: 70 U/L (ref 34–104)
ALT SERPL W P-5'-P-CCNC: 15 U/L (ref 7–52)
ANION GAP SERPL CALCULATED.3IONS-SCNC: 7 MMOL/L (ref 4–13)
AST SERPL W P-5'-P-CCNC: 19 U/L (ref 13–39)
BASOPHILS # BLD AUTO: 0.04 THOUSANDS/ΜL (ref 0–0.1)
BASOPHILS NFR BLD AUTO: 0 % (ref 0–1)
BILIRUB SERPL-MCNC: 0.37 MG/DL (ref 0.2–1)
BUN SERPL-MCNC: 8 MG/DL (ref 5–25)
CALCIUM SERPL-MCNC: 9.6 MG/DL (ref 8.4–10.2)
CHLORIDE SERPL-SCNC: 101 MMOL/L (ref 96–108)
CHOLEST SERPL-MCNC: 151 MG/DL
CO2 SERPL-SCNC: 28 MMOL/L (ref 21–32)
CREAT SERPL-MCNC: 0.7 MG/DL (ref 0.6–1.3)
EOSINOPHIL # BLD AUTO: 0.07 THOUSAND/ΜL (ref 0–0.61)
EOSINOPHIL NFR BLD AUTO: 1 % (ref 0–6)
ERYTHROCYTE [DISTWIDTH] IN BLOOD BY AUTOMATED COUNT: 15 % (ref 11.6–15.1)
EST. AVERAGE GLUCOSE BLD GHB EST-MCNC: 103 MG/DL
GFR SERPL CREATININE-BSD FRML MDRD: 107 ML/MIN/1.73SQ M
GLUCOSE P FAST SERPL-MCNC: 92 MG/DL (ref 65–99)
HBA1C MFR BLD: 5.2 %
HCT VFR BLD AUTO: 34.7 % (ref 34.8–46.1)
HDLC SERPL-MCNC: 68 MG/DL
HGB BLD-MCNC: 11.2 G/DL (ref 11.5–15.4)
IMM GRANULOCYTES # BLD AUTO: 0.05 THOUSAND/UL (ref 0–0.2)
IMM GRANULOCYTES NFR BLD AUTO: 1 % (ref 0–2)
LDLC SERPL CALC-MCNC: 69 MG/DL (ref 0–100)
LYMPHOCYTES # BLD AUTO: 2.37 THOUSANDS/ΜL (ref 0.6–4.47)
LYMPHOCYTES NFR BLD AUTO: 27 % (ref 14–44)
MCH RBC QN AUTO: 28.5 PG (ref 26.8–34.3)
MCHC RBC AUTO-ENTMCNC: 32.3 G/DL (ref 31.4–37.4)
MCV RBC AUTO: 88 FL (ref 82–98)
MONOCYTES # BLD AUTO: 1.02 THOUSAND/ΜL (ref 0.17–1.22)
MONOCYTES NFR BLD AUTO: 11 % (ref 4–12)
NEUTROPHILS # BLD AUTO: 5.37 THOUSANDS/ΜL (ref 1.85–7.62)
NEUTS SEG NFR BLD AUTO: 60 % (ref 43–75)
NONHDLC SERPL-MCNC: 83 MG/DL
NRBC BLD AUTO-RTO: 0 /100 WBCS
PLATELET # BLD AUTO: 329 THOUSANDS/UL (ref 149–390)
PMV BLD AUTO: 9.5 FL (ref 8.9–12.7)
POTASSIUM SERPL-SCNC: 3.7 MMOL/L (ref 3.5–5.3)
PROT SERPL-MCNC: 7.6 G/DL (ref 6.4–8.4)
RBC # BLD AUTO: 3.93 MILLION/UL (ref 3.81–5.12)
SODIUM SERPL-SCNC: 136 MMOL/L (ref 135–147)
TRIGL SERPL-MCNC: 68 MG/DL
WBC # BLD AUTO: 8.92 THOUSAND/UL (ref 4.31–10.16)

## 2024-09-23 PROCEDURE — 80053 COMPREHEN METABOLIC PANEL: CPT

## 2024-09-23 PROCEDURE — 80061 LIPID PANEL: CPT

## 2024-09-23 PROCEDURE — 36415 COLL VENOUS BLD VENIPUNCTURE: CPT

## 2024-09-23 PROCEDURE — 83036 HEMOGLOBIN GLYCOSYLATED A1C: CPT

## 2024-09-23 PROCEDURE — 85025 COMPLETE CBC W/AUTO DIFF WBC: CPT

## 2024-09-30 ENCOUNTER — HOSPITAL ENCOUNTER (OUTPATIENT)
Dept: MAMMOGRAPHY | Facility: CLINIC | Age: 44
Discharge: HOME | End: 2024-09-30
Payer: COMMERCIAL

## 2024-09-30 ENCOUNTER — HOSPITAL ENCOUNTER (OUTPATIENT)
Dept: ULTRASOUND IMAGING | Facility: CLINIC | Age: 44
Discharge: HOME | End: 2024-09-30
Payer: COMMERCIAL

## 2024-09-30 VITALS — BODY MASS INDEX: 51.53 KG/M2 | WEIGHT: 280 LBS | HEIGHT: 62 IN

## 2024-09-30 DIAGNOSIS — R92.8 ABNORMAL FINDINGS ON DIAGNOSTIC IMAGING OF BREAST: ICD-10-CM

## 2024-09-30 PROCEDURE — 77066 DX MAMMO INCL CAD BI: CPT

## 2024-09-30 PROCEDURE — 76642 ULTRASOUND BREAST LIMITED: CPT

## 2024-09-30 PROCEDURE — G0279 TOMOSYNTHESIS, MAMMO: HCPCS

## 2024-10-31 ENCOUNTER — OFFICE VISIT (OUTPATIENT)
Dept: DENTISTRY | Facility: CLINIC | Age: 44
End: 2024-10-31

## 2024-10-31 VITALS — DIASTOLIC BLOOD PRESSURE: 88 MMHG | HEART RATE: 86 BPM | SYSTOLIC BLOOD PRESSURE: 139 MMHG

## 2024-10-31 DIAGNOSIS — K08.50 DEFECTIVE DENTAL RESTORATION: Primary | ICD-10-CM

## 2024-10-31 PROCEDURE — D2392 RESIN-BASED COMPOSITE - 2 SURFACES, POSTERIOR: HCPCS

## 2024-10-31 NOTE — DENTAL PROCEDURE DETAILS
#29 DO Composite Restoration  Patient presents for a dental restoration and verbally consents for treatment:  Reviewed health history-  Pt is ASA type II  Treatment consents signed: Yes  Perio: hx periodontal disease (currently on perio maintenance visits)  Pain Scale: 0  Caries Assessment: moderate  Radiographs: Films are current  Oral Hygiene instruction reviewed and given  Hygiene recall visits recommended to the patient    Patient agrees with the proposed treatment plan for the resin restoration ( tooth surface is already prepped- missing restoration). Tooth prep refined before restoration  Tooth #29 DO  Dental Anesthesia:  1.7 ml 2% lidocaine w 1:100,000 epi administered via mental block  Material:   Etch Ivoclar bond and resin   Shade: Shade A2    Prognosis is guarded- pt is missing posterior vaishali molars (only functional teeth are single premolar and canine occlusion on either side. Informed the patient that restoration may not last for long due to heavy occlusion on only select teeth and that I recommend she come back for tx planning appt to discuss options on how to replace missing posterior teeth (#3 is supra- erupted and there is very limited space on LR (#3 may need to be extracted for partial denture)    Referrals Needed: No  Next visit: tx planning appt

## 2024-11-20 ENCOUNTER — OFFICE VISIT (OUTPATIENT)
Dept: DENTISTRY | Facility: CLINIC | Age: 44
End: 2024-11-20

## 2024-11-20 VITALS — SYSTOLIC BLOOD PRESSURE: 118 MMHG | HEART RATE: 85 BPM | DIASTOLIC BLOOD PRESSURE: 77 MMHG

## 2024-11-20 DIAGNOSIS — K03.0: Primary | ICD-10-CM

## 2024-11-20 PROCEDURE — D0140 LIMITED ORAL EVALUATION - PROBLEM FOCUSED: HCPCS | Performed by: DENTIST

## 2024-11-20 PROCEDURE — D0220 INTRAORAL - PERIAPICAL FIRST RADIOGRAPHIC IMAGE: HCPCS | Performed by: DENTIST

## 2025-04-15 ENCOUNTER — APPOINTMENT (OUTPATIENT)
Dept: LAB | Facility: HOSPITAL | Age: 45
End: 2025-04-15
Attending: FAMILY MEDICINE
Payer: COMMERCIAL

## 2025-04-15 DIAGNOSIS — D64.9 ANEMIA, UNSPECIFIED TYPE: ICD-10-CM

## 2025-04-15 DIAGNOSIS — E55.9 AVITAMINOSIS D: ICD-10-CM

## 2025-04-15 DIAGNOSIS — R73.01 IMPAIRED FASTING GLUCOSE: ICD-10-CM

## 2025-04-15 DIAGNOSIS — I10 ESSENTIAL HYPERTENSION, MALIGNANT: ICD-10-CM

## 2025-04-15 LAB
25(OH)D3 SERPL-MCNC: 25.2 NG/ML (ref 30–100)
ALBUMIN SERPL BCG-MCNC: 4 G/DL (ref 3.5–5)
ALP SERPL-CCNC: 78 U/L (ref 34–104)
ALT SERPL W P-5'-P-CCNC: 23 U/L (ref 7–52)
ANION GAP SERPL CALCULATED.3IONS-SCNC: 9 MMOL/L (ref 4–13)
AST SERPL W P-5'-P-CCNC: 19 U/L (ref 13–39)
BASOPHILS # BLD AUTO: 0.04 THOUSANDS/ÂΜL (ref 0–0.1)
BASOPHILS NFR BLD AUTO: 0 % (ref 0–1)
BILIRUB SERPL-MCNC: 0.34 MG/DL (ref 0.2–1)
BUN SERPL-MCNC: 9 MG/DL (ref 5–25)
CALCIUM SERPL-MCNC: 9.7 MG/DL (ref 8.4–10.2)
CHLORIDE SERPL-SCNC: 101 MMOL/L (ref 96–108)
CHOLEST SERPL-MCNC: 147 MG/DL (ref ?–200)
CO2 SERPL-SCNC: 28 MMOL/L (ref 21–32)
CREAT SERPL-MCNC: 0.61 MG/DL (ref 0.6–1.3)
EOSINOPHIL # BLD AUTO: 0.07 THOUSAND/ÂΜL (ref 0–0.61)
EOSINOPHIL NFR BLD AUTO: 1 % (ref 0–6)
ERYTHROCYTE [DISTWIDTH] IN BLOOD BY AUTOMATED COUNT: 19.3 % (ref 11.6–15.1)
EST. AVERAGE GLUCOSE BLD GHB EST-MCNC: 105 MG/DL
GFR SERPL CREATININE-BSD FRML MDRD: 111 ML/MIN/1.73SQ M
GLUCOSE P FAST SERPL-MCNC: 87 MG/DL (ref 65–99)
HBA1C MFR BLD: 5.3 %
HCT VFR BLD AUTO: 38.7 % (ref 34.8–46.1)
HDLC SERPL-MCNC: 67 MG/DL
HGB BLD-MCNC: 11.8 G/DL (ref 11.5–15.4)
IMM GRANULOCYTES # BLD AUTO: 0.04 THOUSAND/UL (ref 0–0.2)
IMM GRANULOCYTES NFR BLD AUTO: 0 % (ref 0–2)
LDLC SERPL CALC-MCNC: 66 MG/DL (ref 0–100)
LYMPHOCYTES # BLD AUTO: 2.86 THOUSANDS/ÂΜL (ref 0.6–4.47)
LYMPHOCYTES NFR BLD AUTO: 31 % (ref 14–44)
MCH RBC QN AUTO: 24.9 PG (ref 26.8–34.3)
MCHC RBC AUTO-ENTMCNC: 30.5 G/DL (ref 31.4–37.4)
MCV RBC AUTO: 82 FL (ref 82–98)
MONOCYTES # BLD AUTO: 0.96 THOUSAND/ÂΜL (ref 0.17–1.22)
MONOCYTES NFR BLD AUTO: 10 % (ref 4–12)
NEUTROPHILS # BLD AUTO: 5.32 THOUSANDS/ÂΜL (ref 1.85–7.62)
NEUTS SEG NFR BLD AUTO: 58 % (ref 43–75)
NONHDLC SERPL-MCNC: 80 MG/DL
NRBC BLD AUTO-RTO: 0 /100 WBCS
PLATELET # BLD AUTO: 320 THOUSANDS/UL (ref 149–390)
PMV BLD AUTO: 9.6 FL (ref 8.9–12.7)
POTASSIUM SERPL-SCNC: 3.8 MMOL/L (ref 3.5–5.3)
PROT SERPL-MCNC: 8 G/DL (ref 6.4–8.4)
RBC # BLD AUTO: 4.74 MILLION/UL (ref 3.81–5.12)
SODIUM SERPL-SCNC: 138 MMOL/L (ref 135–147)
TRIGL SERPL-MCNC: 71 MG/DL (ref ?–150)
TSH SERPL DL<=0.05 MIU/L-ACNC: 3.37 UIU/ML (ref 0.45–4.5)
WBC # BLD AUTO: 9.29 THOUSAND/UL (ref 4.31–10.16)

## 2025-04-15 PROCEDURE — 80061 LIPID PANEL: CPT

## 2025-04-15 PROCEDURE — 80053 COMPREHEN METABOLIC PANEL: CPT

## 2025-04-15 PROCEDURE — 82306 VITAMIN D 25 HYDROXY: CPT

## 2025-04-15 PROCEDURE — 83036 HEMOGLOBIN GLYCOSYLATED A1C: CPT

## 2025-04-15 PROCEDURE — 36415 COLL VENOUS BLD VENIPUNCTURE: CPT

## 2025-04-15 PROCEDURE — 84443 ASSAY THYROID STIM HORMONE: CPT

## 2025-04-15 PROCEDURE — 85025 COMPLETE CBC W/AUTO DIFF WBC: CPT

## 2025-04-28 ENCOUNTER — HOSPITAL ENCOUNTER (OUTPATIENT)
Dept: MAMMOGRAPHY | Facility: CLINIC | Age: 45
Discharge: HOME | End: 2025-04-28
Payer: MEDICARE

## 2025-04-28 VITALS — BODY MASS INDEX: 51.53 KG/M2 | HEIGHT: 62 IN | WEIGHT: 280 LBS

## 2025-04-28 DIAGNOSIS — R92.8 ABNORMAL FINDINGS ON DIAGNOSTIC IMAGING OF BREAST: ICD-10-CM

## 2025-04-28 PROCEDURE — G0279 TOMOSYNTHESIS, MAMMO: HCPCS

## 2025-04-28 PROCEDURE — 77065 DX MAMMO INCL CAD UNI: CPT

## 2025-05-05 ENCOUNTER — OFFICE VISIT (OUTPATIENT)
Dept: DENTISTRY | Facility: CLINIC | Age: 45
End: 2025-05-05

## 2025-05-05 VITALS — DIASTOLIC BLOOD PRESSURE: 91 MMHG | SYSTOLIC BLOOD PRESSURE: 136 MMHG | HEART RATE: 85 BPM

## 2025-05-05 DIAGNOSIS — K05.6 PERIODONTAL DISEASE: Primary | ICD-10-CM

## 2025-05-05 PROCEDURE — D4910 PERIODONTAL MAINTENANCE: HCPCS

## 2025-05-05 PROCEDURE — D0120 PERIODIC ORAL EVALUATION - ESTABLISHED PATIENT: HCPCS

## 2025-05-05 PROCEDURE — D0274 BITEWINGS - 4 RADIOGRAPHIC IMAGES: HCPCS

## 2025-05-05 NOTE — PROGRESS NOTES
Periodic Exam, 3 Month Periodontal Maintenance, and 4 BWX   REVIEWED MED HX: meds, allergies, health changes reviewed in EPIC. Pt reports taking medication for anemia.   CHIEF CONCERN:  none   PAIN SCALE:  0  ASA CLASS:  ASA 2 - Patient with mild systemic disease with no functional limitations  PLAQUE:  moderate  CALCULUS:  Light  BLEEDING:  moderate  STAIN :   Light  PERIO: 2C    Hygiene Procedures: Scaled, Polished, Flossed and Used Cavitron    Oral Hygiene Instruction: Brushing Minimum 2x daily for 2 minutes, daily flossing    Visual and Tactile Intraoral/ Extraoral evaluation: Oral and Oropharyngeal cancer evaluation. No findings     REFERRALS: none    EXAM: Dr. Valdez Schilling/Dr. Chadwick  #11 dr informed pt of deep decay. Recommend extraction.   Pt concerned with color of front tooth. Crowns previously tx planned for crown. Pt has edge to edge bite. Recommended nite guard after crowns.   Per Dr Chadwick, Dr will contact patient to finalize tx plan    CARIES FINDINGS:  #20-DO    NV:  #20-Do  Nvv: extraction #11    Next Recall: 3 month perio maintenance    Last BWX: 5/5/25  Last Panorex/ FMX : 3/27/24

## 2025-05-22 ENCOUNTER — OFFICE VISIT (OUTPATIENT)
Dept: DENTISTRY | Facility: CLINIC | Age: 45
End: 2025-05-22

## 2025-05-22 VITALS — SYSTOLIC BLOOD PRESSURE: 130 MMHG | DIASTOLIC BLOOD PRESSURE: 87 MMHG | HEART RATE: 85 BPM

## 2025-05-22 DIAGNOSIS — Z01.20 ENCOUNTER FOR DENTAL EXAMINATION: Primary | ICD-10-CM

## 2025-05-22 NOTE — PROGRESS NOTES
Limited Exam    Rosa Gonzalez 44 y.o. female presents with self to [facility] for Limited exam  PMH reviewed, no changes, ASA II. Significant medical history: reviewed. Significant allergies: reviewed. Significant medications: reviewed.    Chief complaint:  Here to try and replace my teeth, my front teeth are chipped    Consent:  Discussed that limited exam focuses on problem area, and same day tx is not guaranteed.  Patient explained to if they wish to have anything else evaluated, they need to return to the practice at which they are a patient of record or schedule a comprehensive exam afterwards.  Patient understands and consent was given by self via verbal consent.        Objective clinical findings:   Oral cancer screening: normal.   Extraoral exam: no remarkable findings.  Intraoral exam: significantly sized caries, gingival inflammation, 11 caries; gingival inflammation, Spot probed 6-7mm unstable perio.     Radiographs: Single PA - 11.         Assessment:  3 supraeruption, 11 nonrestorable caries EXT.    Plan:   3 and 11 ext, anterior restorative, replace missing teeth with partial denture    Referral(s): None needed.  Rx: None.  Comprehensive care disposition: Patient of record.    Patient dismissed ambulatory and alert.    Unstable perio; patient not a candidate for fixed restorative. Patient explained options of treating missing teeth and anterior restorations. Patient stated they need time to think. Patient reports being a marijuana smoker.    NV: 3mrc.    Attending: Dr. Chadwick was present in clinic.

## 2025-07-06 ENCOUNTER — HOSPITAL ENCOUNTER (EMERGENCY)
Facility: HOSPITAL | Age: 45
Discharge: HOME | End: 2025-07-06
Attending: EMERGENCY MEDICINE | Admitting: EMERGENCY MEDICINE
Payer: COMMERCIAL

## 2025-07-06 VITALS
TEMPERATURE: 98.4 F | RESPIRATION RATE: 20 BRPM | DIASTOLIC BLOOD PRESSURE: 90 MMHG | OXYGEN SATURATION: 96 % | SYSTOLIC BLOOD PRESSURE: 175 MMHG | HEART RATE: 94 BPM

## 2025-07-06 DIAGNOSIS — S91.302A AVULSION OF SKIN OF LEFT FOOT, INITIAL ENCOUNTER: Primary | ICD-10-CM

## 2025-07-06 PROCEDURE — 90715 TDAP VACCINE 7 YRS/> IM: CPT

## 2025-07-06 PROCEDURE — 99284 EMERGENCY DEPT VISIT MOD MDM: CPT | Performed by: EMERGENCY MEDICINE

## 2025-07-06 PROCEDURE — 99282 EMERGENCY DEPT VISIT SF MDM: CPT

## 2025-07-06 PROCEDURE — 90471 IMMUNIZATION ADMIN: CPT

## 2025-07-06 RX ORDER — BACITRACIN, NEOMYCIN, POLYMYXIN B 400; 3.5; 5 [USP'U]/G; MG/G; [USP'U]/G
1 OINTMENT TOPICAL ONCE
Status: COMPLETED | OUTPATIENT
Start: 2025-07-06 | End: 2025-07-06

## 2025-07-06 RX ADMIN — TETANUS TOXOID, REDUCED DIPHTHERIA TOXOID AND ACELLULAR PERTUSSIS VACCINE, ADSORBED 0.5 ML: 5; 2.5; 8; 8; 2.5 SUSPENSION INTRAMUSCULAR at 20:29

## 2025-07-06 RX ADMIN — BACITRACIN ZINC, NEOMYCIN, POLYMYXIN B 1 LARGE APPLICATION: 400; 3.5; 5 OINTMENT TOPICAL at 20:29

## 2025-07-07 NOTE — ED PROVIDER NOTES
Time reflects when diagnosis was documented in both MDM as applicable and the Disposition within this note       Time User Action Codes Description Comment    7/6/2025  8:15 PM Bert Daigle Add [S91.302A] Avulsion of skin of left foot, initial encounter           ED Disposition       ED Disposition   Discharge    Condition   Stable    Date/Time   Sun Jul 6, 2025  8:24 PM    Comment   Rosa Carlos discharge to home/self care.                   Assessment & Plan       Medical Decision Making  Patient seen and examined. Physical exam is notable for 2 cm linear wound on the sole of the left foot, middle lateral side, hemostatic, without erythema or purulent drainage. Remainder of exam is within normal limits.    Differential: Foot wound, avulsion of the skin    Tetanus updated, wound cleaned and bacitracin applied, bandage applied.  Patient is agreeable to discharge home with follow-up with PCP and podiatry.  Patient also agreeable to not using magnesium oil and to use lotion on dry skin however not over wound.  All questions answered and return precautions discussed.      Risk  OTC drugs.  Prescription drug management.             Medications   tetanus-diphtheria-acellular pertussis (BOOSTRIX) IM injection 0.5 mL (has no administration in time range)   neomycin-bacitracin-polymyxin b (NEOSPORIN) ointment 1 large application (has no administration in time range)       ED Risk Strat Scores                    No data recorded        SBIRT 20yo+      Flowsheet Row Most Recent Value   Initial Alcohol Screen: US AUDIT-C     1. How often do you have a drink containing alcohol? 0 Filed at: 07/06/2025 2013   2. How many drinks containing alcohol do you have on a typical day you are drinking?  0 Filed at: 07/06/2025 2013   3b. FEMALE Any Age, or MALE 65+: How often do you have 4 or more drinks on one occassion? 0 Filed at: 07/06/2025 2013   Audit-C Score 0 Filed at: 07/06/2025 2013   [place]: How many times in the past year  have you...    Used an illegal drug or used a prescription medication for non-medical reasons? Never Filed at: 07/06/2025 2013                            History of Present Illness       Chief Complaint   Patient presents with    Foot Pain     Patient stating rubbed magnesium oil on her feet 3 weeks ago one time and about a week later foot started peeling. Tonight,  rubbing her feet, peeled a piece of skin off accidentally creating a wound on bottom of L foot       Past Medical History[1]   Past Surgical History[2]   Family History[3]   Social History[4]   E-Cigarette/Vaping    E-Cigarette Use Never User       E-Cigarette/Vaping Substances      I have reviewed and agree with the history as documented.     Rosa Gonzalez is a 44 y.o. female     They presented to the emergency department on July 6, 2025. Patient presents with:  Foot Pain: Patient stating rubbed magnesium oil on her feet 3 weeks ago one time and about a week later foot started peeling. Tonight,  rubbing her feet, peeled a piece of skin off accidentally creating a wound on bottom of L foot  .    The patient states that 3 weeks ago she used magnesium oil once on her feet.  She then experienced some peeling skin on her feet.  Today her significant other was massaging her feet and started peeling a piece of peeling skin.  He accidentally peeled off more than he intended and caused a wound.  He encouraged her to come in for evaluation.  She is unsure when her last tetanus shot was.  She has not been using anything on her feet since she used the magnesium oil.  She tried the magnesium oil because she has periods where her feet feel cold and she notes decreased sensation during these times.  Patient encouraged to try warming her feet with heating pad or warm water or socks and slippers, patient agreeable. Patient denies chest pain, shortness of breath, headache, lightheadedness, dizziness, fevers, chills, nausea, vomiting, hematuria, dysuria,  diarrhea, numbness, tingling, or any other complaint at this time.            History provided by:  Patient      Review of Systems   Constitutional:  Negative for chills, diaphoresis and fever.   HENT:  Negative for congestion, ear pain, postnasal drip, rhinorrhea and sore throat.    Eyes:  Negative for pain and visual disturbance.   Respiratory:  Negative for cough, chest tightness and shortness of breath.    Cardiovascular:  Negative for chest pain and palpitations.   Gastrointestinal:  Negative for abdominal pain, constipation, diarrhea, nausea and vomiting.   Genitourinary:  Negative for dysuria and hematuria.   Musculoskeletal:  Negative for arthralgias and back pain.   Skin:  Positive for wound. Negative for color change and rash.   Neurological:  Negative for dizziness, seizures, syncope, weakness, light-headedness, numbness and headaches.   All other systems reviewed and are negative.          Objective       ED Triage Vitals [07/06/25 2012]   Temperature Pulse Blood Pressure Respirations SpO2 Patient Position - Orthostatic VS   98.4 °F (36.9 °C) 94 (!) 175/90 20 96 % Sitting      Temp Source Heart Rate Source BP Location FiO2 (%) Pain Score    Oral Monitor Right arm -- 8      Vitals      Date and Time Temp Pulse SpO2 Resp BP Pain Score FACES Pain Rating User   07/06/25 2012 98.4 °F (36.9 °C) 94 96 % 20 175/90 8 -- CW            Physical Exam  Constitutional:       General: She is not in acute distress.     Appearance: She is not diaphoretic.   HENT:      Head: Normocephalic and atraumatic.      Nose: No congestion or rhinorrhea.      Mouth/Throat:      Mouth: Mucous membranes are moist.      Pharynx: No oropharyngeal exudate.     Eyes:      General: No scleral icterus.      Cardiovascular:      Rate and Rhythm: Normal rate and regular rhythm.      Heart sounds: Normal heart sounds. No murmur heard.     No friction rub. No gallop.   Pulmonary:      Effort: No respiratory distress.      Breath sounds: Normal  breath sounds. No wheezing, rhonchi or rales.   Abdominal:      General: Abdomen is flat. There is no distension.      Palpations: Abdomen is soft.      Tenderness: There is no abdominal tenderness. There is no guarding.   Lymphadenopathy:      Cervical: No cervical adenopathy.     Skin:     General: Skin is warm and dry.      Capillary Refill: Capillary refill takes less than 2 seconds.      Findings: Wound present. No rash.      Comments: 2 cm linear wound on the sole of the left foot, middle lateral side, hemostatic, without erythema or purulent drainage     Neurological:      General: No focal deficit present.      Mental Status: She is alert and oriented to person, place, and time.         Results Reviewed       None            No orders to display       Procedures    ED Medication and Procedure Management   Prior to Admission Medications   Prescriptions Last Dose Informant Patient Reported? Taking?   AMLODIPINE BESYLATE PO  Self Yes No   Sig: Take 10 mg by mouth daily   Ergocalciferol (VITAMIN D2 PO)  Self Yes No   Sig: Take 1.25 mg by mouth once a week Wednesdays   Wegovy 2.4 MG/0.75ML  Self Yes No   albuterol (PROVENTIL HFA,VENTOLIN HFA) 90 mcg/act inhaler 7/6/2025 Self Yes Yes   Sig: Inhale 2 puffs every 8 (eight) hours as needed   budesonide-formoterol (SYMBICORT) 160-4.5 mcg/act inhaler  Self Yes No   Sig: Inhale 2 puffs 2 (two) times a day Morning and evening   diphenhydramine, lidocaine, Al/Mg hydroxide, simethicone (Magic Mouthwash) SUSP   No No   Sig: Swish and spit 10 mL every 4 (four) hours as needed for mouth pain or discomfort   metoprolol tartrate (LOPRESSOR) 50 mg tablet  Self Yes No   Sig: Take 50 mg by mouth every 12 (twelve) hours   omeprazole (PriLOSEC) 20 mg delayed release capsule   No No   Sig: Take 1 capsule (20 mg total) by mouth daily      Facility-Administered Medications: None     Patient's Medications   Discharge Prescriptions    No medications on file     No discharge procedures on  file.  ED SEPSIS DOCUMENTATION   Time reflects when diagnosis was documented in both MDM as applicable and the Disposition within this note       Time User Action Codes Description Comment    2025  8:15 PM Bert Daigle Add [S91.302A] Avulsion of skin of left foot, initial encounter                    [1]   Past Medical History:  Diagnosis Date    Anemia     Heart abnormality     as per pt. takes med for chest pressure, thinks she has murmur    Hypertension    [2]   Past Surgical History:  Procedure Laterality Date     SECTION N/A     x 2    GASTRIC BYPASS      US GUIDED BREAST LYMPH NODE BIOPSY LEFT Left 10/02/2023    US GUIDED LYMPH NODE BIOPSY LEFT  2021    US GUIDED LYMPH NODE BIOPSY RIGHT  2021   [3]   Family History  Problem Relation Name Age of Onset    No Known Problems Mother      No Known Problems Father      No Known Problems Sister      No Known Problems Sister      No Known Problems Sister      No Known Problems Sister      No Known Problems Sister      No Known Problems Daughter      No Known Problems Maternal Grandmother      No Known Problems Maternal Grandfather      No Known Problems Paternal Grandmother      No Known Problems Paternal Grandfather      No Known Problems Brother      No Known Problems Brother      No Known Problems Brother      No Known Problems Brother      No Known Problems Son      No Known Problems Maternal Aunt      No Known Problems Maternal Aunt      No Known Problems Maternal Aunt      No Known Problems Maternal Aunt      No Known Problems Paternal Aunt      No Known Problems Paternal Aunt      BRCA2 Positive Neg Hx      BRCA2 Negative Neg Hx      Ovarian cancer Neg Hx      BRCA1 Positive Neg Hx      BRCA1 Negative Neg Hx      BRCA 1/2 Neg Hx      Endometrial cancer Neg Hx      Colon cancer Neg Hx      Breast cancer additional onset Neg Hx      Breast cancer Neg Hx     [4]   Social History  Tobacco Use    Smoking status: Former     Types: Cigars     Smokeless tobacco: Never   Vaping Use    Vaping status: Never Used   Substance Use Topics    Alcohol use: Yes    Drug use: Yes     Types: Marijuana        Aditya Bailey MD  07/06/25 3620

## 2025-07-07 NOTE — ED ATTENDING ATTESTATION
7/6/2025  I, Bert Daigle MD, saw and evaluated the patient. I have discussed the patient with the resident/non-physician practitioner and agree with the resident's/non-physician practitioner's findings, Plan of Care, and MDM as documented in the resident's/non-physician practitioner's note, except where noted. All available labs and Radiology studies were reviewed.  I was present for key portions of any procedure(s) performed by the resident/non-physician practitioner and I was immediately available to provide assistance.       At this point I agree with the current assessment done in the Emergency Department.  I have conducted an independent evaluation of this patient a history and physical is as follows:    Patient is a 44-year-old female seen in the emergency department with concern for skin avulsion to left foot.  Patient has approximately 3 x 1 cm area of skin avulsion to plantar aspect of left foot. Patient is afebrile, with no evidence of infection on evaluation.      ED Course  Patient is a 44-year-old female seen in the emergency department with concern for skin avulsion to left foot.  Wound was cleaned and dressed in the emergency department.  Tdap was ordered.  Evaluation is not consistent with cellulitis or deep space infection.  Plan to have patient follow up with PCP/outpatient providers.  Patient stable for discharge home.  Discharge instructions were reviewed with patient.

## 2025-07-07 NOTE — ED NOTES
Pt wound cleaned, abx ointment, tefla, abd and gauze wrapped applied      Mihaela MCKEON RN  07/06/25 2040

## 2025-07-07 NOTE — DISCHARGE INSTRUCTIONS
Dear Rosa Gonzalez,     It was our pleasure to care for you here at [facility]. It was an honor and privilege to be part of your health care team. Please review this entire after visit summary and read your personalized discharge instructions below:  Please follow up with your PCP within 1 week or as soon as possible.  Please take Tylenol and Ibuprofen as needed. and please wash your foot with soap and water each night then apply bacitracin then gauze or bandage. You may walk normally.  Please return to the ER if your symptoms worsen or fail to improve, if you experience severe pain, fever, cloudy drainage, or if you experience anything concerning to you.    Sincerely,     Aditya Bailey MD

## 2025-07-11 ENCOUNTER — OFFICE VISIT (OUTPATIENT)
Dept: DENTISTRY | Facility: CLINIC | Age: 45
End: 2025-07-11

## 2025-07-11 VITALS — HEART RATE: 102 BPM | DIASTOLIC BLOOD PRESSURE: 82 MMHG | SYSTOLIC BLOOD PRESSURE: 139 MMHG

## 2025-07-11 DIAGNOSIS — K02.61 DENTAL CARIES ON SMOOTH SURFACE LIMITED TO ENAMEL: Primary | ICD-10-CM

## 2025-07-11 PROCEDURE — D2392 RESIN-BASED COMPOSITE - 2 SURFACES, POSTERIOR: HCPCS | Performed by: DENTIST

## 2025-07-11 NOTE — PROGRESS NOTES
Patient presents for a dental restoration and verbally consents for treatment:  Reviewed health history-  Pt is ASA type  2  Treatment consents signed: Yes  Perio: periodontitis  Pain Scale: 1  Caries Assessment: moderate  Radiographs: Films are current  Oral Hygiene instruction reviewed and given  Hygiene recall visits recommended to the patient  Oral cancer screening done and no pathology noted on ROM, FOM , Palate,soft tissue or tongue.    Patient agrees with the diagnosis of Caries and the proposed treatment plan for the resin restoration:  Tooth # 20  DO  Dental Anesthesia: 1 Carpule 2% Lidocaine 1:100K epi infiltrations.  Excavated caries with high speed and round bur on slow speed.  Material:  LIMELITE, etch and rinsed. Ivoclar shell placed and EvoCeram resin placed and cured.  Shade: a-3  Polished with finishing burs and Enhance. Occlusion adjusted and contact good and adequate.   Gave post op instructions to avoid chewing till numbness goes away.    All questions answered. Pt left satisfied and in good health.    Prognosis is Good.   Referrals Needed: No      Next visit: DISCUSS partials, #11    [place]